# Patient Record
Sex: FEMALE | Race: WHITE | Employment: OTHER | URBAN - METROPOLITAN AREA
[De-identification: names, ages, dates, MRNs, and addresses within clinical notes are randomized per-mention and may not be internally consistent; named-entity substitution may affect disease eponyms.]

---

## 2017-08-01 ENCOUNTER — TELEPHONE (OUTPATIENT)
Dept: FAMILY MEDICINE CLINIC | Age: 82
End: 2017-08-01

## 2017-08-28 ENCOUNTER — TELEPHONE (OUTPATIENT)
Dept: CARDIOLOGY CLINIC | Age: 82
End: 2017-08-28

## 2018-07-04 PROBLEM — S72.141A CLOSED 2-PART INTERTROCHANTERIC FRACTURE OF PROXIMAL END OF RIGHT FEMUR (HCC): Status: ACTIVE | Noted: 2018-07-04

## 2018-07-15 PROBLEM — R55 SYNCOPE AND COLLAPSE: Status: ACTIVE | Noted: 2018-07-15

## 2018-07-15 PROBLEM — R77.8 ELEVATED TROPONIN: Status: ACTIVE | Noted: 2018-07-15

## 2018-07-15 PROBLEM — S72.141D CLOSED DISPLACED INTERTROCHANTERIC FRACTURE OF RIGHT FEMUR WITH ROUTINE HEALING: Status: ACTIVE | Noted: 2018-07-04

## 2018-07-15 PROBLEM — N39.0 UTI (URINARY TRACT INFECTION): Status: ACTIVE | Noted: 2018-07-15

## 2018-07-20 ENCOUNTER — OFFICE VISIT (OUTPATIENT)
Dept: ORTHOPEDIC SURGERY | Age: 83
End: 2018-07-20

## 2018-07-20 VITALS — HEIGHT: 65 IN | BODY MASS INDEX: 31.32 KG/M2 | RESPIRATION RATE: 16 BRPM | WEIGHT: 188 LBS

## 2018-07-20 DIAGNOSIS — S72.141D CLOSED DISPLACED INTERTROCHANTERIC FRACTURE OF RIGHT FEMUR WITH ROUTINE HEALING: Primary | ICD-10-CM

## 2018-07-20 PROCEDURE — 99024 POSTOP FOLLOW-UP VISIT: CPT | Performed by: ORTHOPAEDIC SURGERY

## 2018-07-20 NOTE — PROGRESS NOTES
ORTHOPAEDIC PROGRESS NOTE    Chief Complaint   Patient presents with    Post-Op Check     Right hip cephalomedullary nail; DOS 7/6/18. HPI  7/20/2018  First postop  At [facility] doing well  Denies N/T  Denies wound issues  Denies lightheadedness, chest pain, SOB    Vitals:    07/20/18 0925   Resp: 16   Weight: 188 lb (85.3 kg)   Height: 5' 5\" (1.651 m)       Physical Exam  RLE - incisions well healed, c/d/i    negative log roll   Able to stand up    Peripheral edema bilaterally  SILT SP/DP/T/sural/saphenous nerve distributions; EHL/TA/GS intact      Imaging:  Images were personally reviewed by myself and discussed with the patient  Right femur 4 views performed today in clinic - s/p CMN to IT fx. Alignment stable. No hardware issues. Assessment & Plan:  80 y.o. female following up for   Diagnosis Orders   1. Recent intertrochanteric fracture of right femur s/p Right hip cephalomedullary nail  XR FEMUR RIGHT (MIN 2 VIEWS)       No orders of the defined types were placed in this encounter.     RLE WBAT  Assistance at all times when OOB  RLE WBAT  Baseline baby ASA and plavix  Recommend bilateral compression stockings    She is on vit D3  Will need DEXA scan at some point    FU 6 months for hopefully final check    Eligah Epley

## 2018-07-25 ENCOUNTER — PROCEDURE VISIT (OUTPATIENT)
Dept: CARDIOLOGY CLINIC | Age: 83
End: 2018-07-25

## 2018-07-25 DIAGNOSIS — R55 SYNCOPE AND COLLAPSE: ICD-10-CM

## 2018-07-25 DIAGNOSIS — Z45.09 ENCOUNTER FOR ELECTRONIC ANALYSIS OF REVEAL EVENT RECORDER: Primary | ICD-10-CM

## 2018-07-25 PROCEDURE — 93291 INTERROG DEV EVAL SCRMS IP: CPT | Performed by: INTERNAL MEDICINE

## 2018-08-14 PROBLEM — R77.8 ELEVATED TROPONIN: Status: RESOLVED | Noted: 2018-07-15 | Resolved: 2018-08-14

## 2018-08-14 PROBLEM — N39.0 UTI (URINARY TRACT INFECTION): Status: RESOLVED | Noted: 2018-07-15 | Resolved: 2018-08-14

## 2018-08-28 ENCOUNTER — NURSE ONLY (OUTPATIENT)
Dept: CARDIOLOGY CLINIC | Age: 83
End: 2018-08-28

## 2018-08-28 DIAGNOSIS — Z45.09 ENCOUNTER FOR ELECTRONIC ANALYSIS OF REVEAL EVENT RECORDER: ICD-10-CM

## 2018-08-28 DIAGNOSIS — I48.0 PAROXYSMAL ATRIAL FIBRILLATION (HCC): ICD-10-CM

## 2018-08-28 PROCEDURE — 93298 REM INTERROG DEV EVAL SCRMS: CPT | Performed by: INTERNAL MEDICINE

## 2018-08-28 PROCEDURE — 93299 PR REM INTERROG ICPMS/SCRMS <30 D TECH REVIEW: CPT | Performed by: INTERNAL MEDICINE

## 2018-08-30 NOTE — PROGRESS NOTES
Carelink/loop recorder transmission. No new episodes/arrhythmias recorded since last interrogation. Dr. Robert Florentino to review interrogation. See interrogation/Paceart report for further details.

## 2018-09-05 ENCOUNTER — TELEPHONE (OUTPATIENT)
Dept: CARDIOLOGY CLINIC | Age: 83
End: 2018-09-05

## 2018-09-05 NOTE — TELEPHONE ENCOUNTER
Kenny Organ          9/5/18 2:09 PM   Note      Son calling to find out when and if this patient needs to be seen again .

## 2018-09-18 ENCOUNTER — OFFICE VISIT (OUTPATIENT)
Dept: ORTHOPEDIC SURGERY | Age: 83
End: 2018-09-18

## 2018-09-18 VITALS — WEIGHT: 180 LBS | BODY MASS INDEX: 29.99 KG/M2 | HEIGHT: 65 IN

## 2018-09-18 DIAGNOSIS — S72.141D CLOSED DISPLACED INTERTROCHANTERIC FRACTURE OF RIGHT FEMUR WITH ROUTINE HEALING: Primary | ICD-10-CM

## 2018-09-18 PROCEDURE — 99024 POSTOP FOLLOW-UP VISIT: CPT | Performed by: ORTHOPAEDIC SURGERY

## 2018-10-04 ENCOUNTER — OFFICE VISIT (OUTPATIENT)
Dept: CARDIOLOGY CLINIC | Age: 83
End: 2018-10-04
Payer: COMMERCIAL

## 2018-10-04 VITALS
SYSTOLIC BLOOD PRESSURE: 151 MMHG | HEIGHT: 65 IN | WEIGHT: 180 LBS | HEART RATE: 57 BPM | DIASTOLIC BLOOD PRESSURE: 75 MMHG | BODY MASS INDEX: 29.99 KG/M2

## 2018-10-04 DIAGNOSIS — I48.19 PERSISTENT ATRIAL FIBRILLATION (HCC): ICD-10-CM

## 2018-10-04 DIAGNOSIS — I48.0 PAROXYSMAL ATRIAL FIBRILLATION (HCC): ICD-10-CM

## 2018-10-04 DIAGNOSIS — I10 ESSENTIAL HYPERTENSION: ICD-10-CM

## 2018-10-04 DIAGNOSIS — R55 SYNCOPE AND COLLAPSE: Primary | ICD-10-CM

## 2018-10-04 DIAGNOSIS — R00.1 BRADYCARDIA: ICD-10-CM

## 2018-10-04 DIAGNOSIS — Z45.09 ENCOUNTER FOR ELECTRONIC ANALYSIS OF REVEAL EVENT RECORDER: ICD-10-CM

## 2018-10-04 PROCEDURE — 93291 INTERROG DEV EVAL SCRMS IP: CPT | Performed by: INTERNAL MEDICINE

## 2018-10-04 PROCEDURE — 99215 OFFICE O/P EST HI 40 MIN: CPT | Performed by: INTERNAL MEDICINE

## 2018-10-04 RX ORDER — AMIODARONE HYDROCHLORIDE 200 MG/1
100 TABLET ORAL DAILY
Qty: 30 TABLET | Refills: 5 | Status: SHIPPED | OUTPATIENT
Start: 2018-10-04

## 2018-10-04 NOTE — PROGRESS NOTES
significant gary down to 20 and 3 sec pause were seen. Therefore, I believe she needs a Permanent pacemaker. Will decrease amiodarone to 100 mg but I am afraid we can not stop it due to risk of Atrial fibrillation and that she can not be on anticoagulation. 7/17/2018 ILR placed for assessment   Pauses and significant bradycardia noted on today's interrogation.    -  I have discussed the procedure of a pacemaker implant related to her bradcardia. Risks, benefits and alternatives in detail with patient and family. The risks including, but not limited to, the risks of bleeding, infection, pain, device malfunction, lead dislodgement, radiation exposure, injury to cardiac and surrounding structures (including pneumothorax), stroke, cardiac perforation, tamponade, need for emergent heart surgery, myocardial infarction and death were discussed in detail. The patient declines pacemaker. She feels at her age she is content with the way things are.    -daughter states she and her brother will discuss PPM with patient to decide if she would like to proceed. - Cardiac device in situ - ILR 7/17/2018       Interrogation today NSR, 1 pause 8/30/18 3 seconds. 20 gary events 8/30 - 9/12/2018, longest at 2 minutes, rate <30bpm.  No AT/AF. -PAF              On amiodarone; reduce to 100mg today              In sinus rhythm; no AF noted on interrogation of ILR today     - HTN              BP is acceptable. Continue current meds. Plan: Will send paperwork to [facility] for scheduling of PPM, so it is available should she decide to proceed  Decrease amiodarone to 100mg  Follow up 6 months with EP      I independently reviewed device check interrogation     Thank you for allowing me to participate in the care of Justin Valencia. Further evaluation will be based upon the patient's clinical course and testing results. All questions and concerns were addressed to the patient/family.

## 2019-03-07 ENCOUNTER — HOSPITAL ENCOUNTER (EMERGENCY)
Age: 84
Discharge: HOME | End: 2019-03-07
Payer: COMMERCIAL

## 2019-03-07 VITALS
TEMPERATURE: 98.7 F | BODY MASS INDEX: 29.99 KG/M2 | SYSTOLIC BLOOD PRESSURE: 166 MMHG | HEIGHT: 65 IN | DIASTOLIC BLOOD PRESSURE: 57 MMHG | OXYGEN SATURATION: 100 % | HEART RATE: 62 BPM | RESPIRATION RATE: 16 BRPM | WEIGHT: 180 LBS

## 2019-03-07 DIAGNOSIS — Z87.898 H/O EPISTAXIS: Primary | ICD-10-CM

## 2019-03-07 PROCEDURE — 99283 EMERGENCY DEPT VISIT LOW MDM: CPT

## 2019-03-07 ASSESSMENT — ENCOUNTER SYMPTOMS
VOMITING: 0
RHINORRHEA: 0
SHORTNESS OF BREATH: 0
ABDOMINAL PAIN: 0
BLOOD IN STOOL: 0
NAUSEA: 0
DIARRHEA: 0
SORE THROAT: 0
CONSTIPATION: 0

## 2019-07-24 ENCOUNTER — TELEPHONE (OUTPATIENT)
Dept: FAMILY MEDICINE CLINIC | Age: 84
End: 2019-07-24

## 2019-10-03 ENCOUNTER — TELEPHONE (OUTPATIENT)
Dept: CARDIOLOGY CLINIC | Age: 84
End: 2019-10-03

## 2019-10-04 ENCOUNTER — TELEPHONE (OUTPATIENT)
Dept: CARDIOLOGY CLINIC | Age: 84
End: 2019-10-04

## 2019-12-09 ENCOUNTER — PROCEDURE VISIT (OUTPATIENT)
Dept: AUDIOLOGY | Age: 84
End: 2019-12-09
Payer: COMMERCIAL

## 2019-12-09 ENCOUNTER — OFFICE VISIT (OUTPATIENT)
Dept: ENT CLINIC | Age: 84
End: 2019-12-09
Payer: COMMERCIAL

## 2019-12-09 VITALS — SYSTOLIC BLOOD PRESSURE: 142 MMHG | OXYGEN SATURATION: 98 % | DIASTOLIC BLOOD PRESSURE: 84 MMHG | HEART RATE: 71 BPM

## 2019-12-09 DIAGNOSIS — H61.23 BILATERAL IMPACTED CERUMEN: Primary | ICD-10-CM

## 2019-12-09 DIAGNOSIS — H90.3 SENSORINEURAL HEARING LOSS (SNHL) OF BOTH EARS: ICD-10-CM

## 2019-12-09 DIAGNOSIS — H91.93 SEVERE HEARING LOSS OF BOTH EARS: ICD-10-CM

## 2019-12-09 PROCEDURE — 99202 OFFICE O/P NEW SF 15 MIN: CPT | Performed by: OTOLARYNGOLOGY

## 2019-12-09 PROCEDURE — 69210 REMOVE IMPACTED EAR WAX UNI: CPT | Performed by: OTOLARYNGOLOGY

## 2019-12-09 PROCEDURE — 92557 COMPREHENSIVE HEARING TEST: CPT | Performed by: AUDIOLOGIST

## 2019-12-09 ASSESSMENT — ENCOUNTER SYMPTOMS
FACIAL SWELLING: 0
ALLERGIC/IMMUNOLOGIC NEGATIVE: 1
RESPIRATORY NEGATIVE: 1
VOICE CHANGE: 0
SINUS PRESSURE: 0
TROUBLE SWALLOWING: 0
RHINORRHEA: 0
SORE THROAT: 0
SINUS PAIN: 0

## 2020-02-17 ENCOUNTER — NURSE ONLY (OUTPATIENT)
Dept: CARDIOLOGY CLINIC | Age: 85
End: 2020-02-17
Payer: COMMERCIAL

## 2020-02-17 PROCEDURE — G2066 INTER DEVC REMOTE 30D: HCPCS | Performed by: INTERNAL MEDICINE

## 2020-02-17 PROCEDURE — 93298 REM INTERROG DEV EVAL SCRMS: CPT | Performed by: INTERNAL MEDICINE

## 2020-02-17 NOTE — PROGRESS NOTES
[employer] remote Linq report shows no recent arrhythmia recordings. We will continue to monitor remotely.

## 2020-03-25 PROBLEM — I10 HTN (HYPERTENSION): Status: RESOLVED | Noted: 2020-03-25 | Resolved: 2020-03-24

## 2020-03-25 PROBLEM — E03.9 HYPOTHYROIDISM: Status: RESOLVED | Noted: 2020-03-25 | Resolved: 2020-03-24

## 2020-04-26 ENCOUNTER — APPOINTMENT (OUTPATIENT)
Dept: CT IMAGING | Age: 85
End: 2020-04-26
Payer: COMMERCIAL

## 2020-04-26 ENCOUNTER — APPOINTMENT (OUTPATIENT)
Dept: GENERAL RADIOLOGY | Age: 85
End: 2020-04-26
Payer: COMMERCIAL

## 2020-04-26 ENCOUNTER — HOSPITAL ENCOUNTER (OUTPATIENT)
Age: 85
Setting detail: OBSERVATION
Discharge: NURSING, REHAB OR LONG-TERM CARE | End: 2020-04-30
Attending: EMERGENCY MEDICINE | Admitting: INTERNAL MEDICINE
Payer: COMMERCIAL

## 2020-04-26 LAB
A/G RATIO: 1.4 (ref 1.1–2.2)
ALBUMIN SERPL-MCNC: 4 G/DL (ref 3.4–5)
ALP BLD-CCNC: 90 U/L (ref 40–129)
ALT SERPL-CCNC: <5 U/L (ref 10–40)
ANION GAP SERPL CALCULATED.3IONS-SCNC: 12 MMOL/L (ref 3–16)
APTT: 29.3 SEC (ref 24.2–36.2)
AST SERPL-CCNC: 12 U/L (ref 15–37)
BASOPHILS ABSOLUTE: 0 K/UL (ref 0–0.2)
BASOPHILS RELATIVE PERCENT: 0.6 %
BILIRUB SERPL-MCNC: 0.5 MG/DL (ref 0–1)
BUN BLDV-MCNC: 11 MG/DL (ref 7–20)
CALCIUM SERPL-MCNC: 10 MG/DL (ref 8.3–10.6)
CHLORIDE BLD-SCNC: 101 MMOL/L (ref 99–110)
CO2: 20 MMOL/L (ref 21–32)
CREAT SERPL-MCNC: 1 MG/DL (ref 0.6–1.2)
EOSINOPHILS ABSOLUTE: 0.1 K/UL (ref 0–0.6)
EOSINOPHILS RELATIVE PERCENT: 0.9 %
GFR AFRICAN AMERICAN: >60
GFR NON-AFRICAN AMERICAN: 52
GLOBULIN: 2.8 G/DL
GLUCOSE BLD-MCNC: 105 MG/DL (ref 70–99)
HCT VFR BLD CALC: 47 % (ref 36–48)
HEMOGLOBIN: 15.6 G/DL (ref 12–16)
INR BLD: 1.01 (ref 0.86–1.14)
LYMPHOCYTES ABSOLUTE: 0.9 K/UL (ref 1–5.1)
LYMPHOCYTES RELATIVE PERCENT: 11.7 %
MCH RBC QN AUTO: 30.6 PG (ref 26–34)
MCHC RBC AUTO-ENTMCNC: 33.1 G/DL (ref 31–36)
MCV RBC AUTO: 92.5 FL (ref 80–100)
MONOCYTES ABSOLUTE: 0.4 K/UL (ref 0–1.3)
MONOCYTES RELATIVE PERCENT: 5.9 %
NEUTROPHILS ABSOLUTE: 5.9 K/UL (ref 1.7–7.7)
NEUTROPHILS RELATIVE PERCENT: 80.9 %
PDW BLD-RTO: 13.7 % (ref 12.4–15.4)
PLATELET # BLD: 137 K/UL (ref 135–450)
PMV BLD AUTO: 10.1 FL (ref 5–10.5)
POTASSIUM REFLEX MAGNESIUM: 4 MMOL/L (ref 3.5–5.1)
PRO-BNP: 931 PG/ML (ref 0–449)
PROTHROMBIN TIME: 11.7 SEC (ref 10–13.2)
RBC # BLD: 5.09 M/UL (ref 4–5.2)
SODIUM BLD-SCNC: 133 MMOL/L (ref 136–145)
TOTAL PROTEIN: 6.8 G/DL (ref 6.4–8.2)
TROPONIN: <0.01 NG/ML
WBC # BLD: 7.3 K/UL (ref 4–11)

## 2020-04-26 PROCEDURE — 80053 COMPREHEN METABOLIC PANEL: CPT

## 2020-04-26 PROCEDURE — 99285 EMERGENCY DEPT VISIT HI MDM: CPT

## 2020-04-26 PROCEDURE — 84484 ASSAY OF TROPONIN QUANT: CPT

## 2020-04-26 PROCEDURE — 36415 COLL VENOUS BLD VENIPUNCTURE: CPT

## 2020-04-26 PROCEDURE — 86901 BLOOD TYPING SEROLOGIC RH(D): CPT

## 2020-04-26 PROCEDURE — 86850 RBC ANTIBODY SCREEN: CPT

## 2020-04-26 PROCEDURE — 85025 COMPLETE CBC W/AUTO DIFF WBC: CPT

## 2020-04-26 PROCEDURE — 70450 CT HEAD/BRAIN W/O DYE: CPT

## 2020-04-26 PROCEDURE — 71045 X-RAY EXAM CHEST 1 VIEW: CPT

## 2020-04-26 PROCEDURE — 93005 ELECTROCARDIOGRAM TRACING: CPT | Performed by: PHYSICIAN ASSISTANT

## 2020-04-26 PROCEDURE — 85610 PROTHROMBIN TIME: CPT

## 2020-04-26 PROCEDURE — 6370000000 HC RX 637 (ALT 250 FOR IP): Performed by: PHYSICIAN ASSISTANT

## 2020-04-26 PROCEDURE — 72128 CT CHEST SPINE W/O DYE: CPT

## 2020-04-26 PROCEDURE — 83880 ASSAY OF NATRIURETIC PEPTIDE: CPT

## 2020-04-26 PROCEDURE — 72125 CT NECK SPINE W/O DYE: CPT

## 2020-04-26 PROCEDURE — 72131 CT LUMBAR SPINE W/O DYE: CPT

## 2020-04-26 PROCEDURE — 86900 BLOOD TYPING SEROLOGIC ABO: CPT

## 2020-04-26 PROCEDURE — 85730 THROMBOPLASTIN TIME PARTIAL: CPT

## 2020-04-26 RX ORDER — ONDANSETRON 4 MG/1
8 TABLET, ORALLY DISINTEGRATING ORAL ONCE
Status: COMPLETED | OUTPATIENT
Start: 2020-04-26 | End: 2020-04-26

## 2020-04-26 RX ORDER — HYDROCODONE BITARTRATE AND ACETAMINOPHEN 5; 325 MG/1; MG/1
1 TABLET ORAL ONCE
Status: COMPLETED | OUTPATIENT
Start: 2020-04-26 | End: 2020-04-26

## 2020-04-26 RX ADMIN — HYDROCODONE BITARTRATE AND ACETAMINOPHEN 1 TABLET: 5; 325 TABLET ORAL at 22:28

## 2020-04-26 RX ADMIN — ONDANSETRON 8 MG: 4 TABLET, ORALLY DISINTEGRATING ORAL at 22:29

## 2020-04-26 ASSESSMENT — PAIN SCALES - GENERAL
PAINLEVEL_OUTOF10: 10
PAINLEVEL_OUTOF10: 10

## 2020-04-26 ASSESSMENT — PAIN DESCRIPTION - PAIN TYPE: TYPE: ACUTE PAIN

## 2020-04-26 ASSESSMENT — PAIN DESCRIPTION - LOCATION: LOCATION: BACK;HEAD

## 2020-04-27 ENCOUNTER — APPOINTMENT (OUTPATIENT)
Dept: GENERAL RADIOLOGY | Age: 85
End: 2020-04-27
Payer: COMMERCIAL

## 2020-04-27 ENCOUNTER — APPOINTMENT (OUTPATIENT)
Dept: CT IMAGING | Age: 85
End: 2020-04-27
Payer: COMMERCIAL

## 2020-04-27 ENCOUNTER — NURSE ONLY (OUTPATIENT)
Dept: CARDIOLOGY CLINIC | Age: 85
End: 2020-04-27
Payer: COMMERCIAL

## 2020-04-27 PROBLEM — S06.5XAA SUBDURAL HEMATOMA: Status: ACTIVE | Noted: 2020-04-27

## 2020-04-27 PROBLEM — R27.0 ATAXIA: Status: ACTIVE | Noted: 2020-04-27

## 2020-04-27 PROBLEM — S72.141D CLOSED DISPLACED INTERTROCHANTERIC FRACTURE OF RIGHT FEMUR WITH ROUTINE HEALING: Status: RESOLVED | Noted: 2018-07-04 | Resolved: 2020-04-27

## 2020-04-27 LAB
ABO/RH: NORMAL
ANTIBODY SCREEN: NORMAL
BACTERIA: ABNORMAL /HPF
BILIRUBIN URINE: NEGATIVE
BLOOD, URINE: NEGATIVE
CLARITY: ABNORMAL
COLOR: YELLOW
EKG ATRIAL RATE: 63 BPM
EKG DIAGNOSIS: NORMAL
EKG P AXIS: 34 DEGREES
EKG P-R INTERVAL: 130 MS
EKG Q-T INTERVAL: 416 MS
EKG QRS DURATION: 102 MS
EKG QTC CALCULATION (BAZETT): 425 MS
EKG R AXIS: -33 DEGREES
EKG T AXIS: 77 DEGREES
EKG VENTRICULAR RATE: 63 BPM
EPITHELIAL CELLS, UA: 2 /HPF (ref 0–5)
GLUCOSE URINE: NEGATIVE MG/DL
HYALINE CASTS: 2 /LPF (ref 0–8)
KETONES, URINE: NEGATIVE MG/DL
LEUKOCYTE ESTERASE, URINE: ABNORMAL
MICROSCOPIC EXAMINATION: YES
NITRITE, URINE: NEGATIVE
PH UA: 7.5 (ref 5–8)
PROTEIN UA: NEGATIVE MG/DL
RBC UA: 3 /HPF (ref 0–4)
SPECIFIC GRAVITY UA: 1.01 (ref 1–1.03)
URINE REFLEX TO CULTURE: YES
URINE TYPE: ABNORMAL
UROBILINOGEN, URINE: 0.2 E.U./DL
WBC UA: 7 /HPF (ref 0–5)

## 2020-04-27 PROCEDURE — G0378 HOSPITAL OBSERVATION PER HR: HCPCS

## 2020-04-27 PROCEDURE — G2066 INTER DEVC REMOTE 30D: HCPCS | Performed by: INTERNAL MEDICINE

## 2020-04-27 PROCEDURE — 93010 ELECTROCARDIOGRAM REPORT: CPT | Performed by: INTERNAL MEDICINE

## 2020-04-27 PROCEDURE — 93298 REM INTERROG DEV EVAL SCRMS: CPT | Performed by: INTERNAL MEDICINE

## 2020-04-27 PROCEDURE — 6370000000 HC RX 637 (ALT 250 FOR IP): Performed by: INTERNAL MEDICINE

## 2020-04-27 PROCEDURE — 70450 CT HEAD/BRAIN W/O DYE: CPT

## 2020-04-27 PROCEDURE — 73090 X-RAY EXAM OF FOREARM: CPT

## 2020-04-27 PROCEDURE — 87086 URINE CULTURE/COLONY COUNT: CPT

## 2020-04-27 PROCEDURE — 81001 URINALYSIS AUTO W/SCOPE: CPT

## 2020-04-27 RX ORDER — CLOPIDOGREL BISULFATE 75 MG/1
75 TABLET ORAL DAILY
Status: DISCONTINUED | OUTPATIENT
Start: 2020-04-27 | End: 2020-04-27

## 2020-04-27 RX ORDER — VITAMIN B COMPLEX
5000 TABLET ORAL EVERY OTHER DAY
Status: DISCONTINUED | OUTPATIENT
Start: 2020-04-27 | End: 2020-04-30 | Stop reason: HOSPADM

## 2020-04-27 RX ORDER — AMIODARONE HYDROCHLORIDE 200 MG/1
100 TABLET ORAL DAILY
Status: DISCONTINUED | OUTPATIENT
Start: 2020-04-27 | End: 2020-04-30 | Stop reason: HOSPADM

## 2020-04-27 RX ORDER — HYDRALAZINE HYDROCHLORIDE 10 MG/1
10 TABLET, FILM COATED ORAL EVERY 4 HOURS PRN
Status: DISCONTINUED | OUTPATIENT
Start: 2020-04-27 | End: 2020-04-30 | Stop reason: HOSPADM

## 2020-04-27 RX ORDER — ONDANSETRON 2 MG/ML
4 INJECTION INTRAMUSCULAR; INTRAVENOUS EVERY 6 HOURS PRN
Status: DISCONTINUED | OUTPATIENT
Start: 2020-04-27 | End: 2020-04-30 | Stop reason: HOSPADM

## 2020-04-27 RX ORDER — SENNA PLUS 8.6 MG/1
1 TABLET ORAL DAILY PRN
Status: DISCONTINUED | OUTPATIENT
Start: 2020-04-27 | End: 2020-04-30 | Stop reason: HOSPADM

## 2020-04-27 RX ORDER — LATANOPROST 50 UG/ML
1 SOLUTION/ DROPS OPHTHALMIC NIGHTLY
Status: DISCONTINUED | OUTPATIENT
Start: 2020-04-27 | End: 2020-04-27 | Stop reason: ALTCHOICE

## 2020-04-27 RX ORDER — LEVOTHYROXINE SODIUM 0.07 MG/1
75 TABLET ORAL DAILY
Status: DISCONTINUED | OUTPATIENT
Start: 2020-04-27 | End: 2020-04-30 | Stop reason: HOSPADM

## 2020-04-27 RX ORDER — HYDRALAZINE HYDROCHLORIDE 25 MG/1
25 TABLET, FILM COATED ORAL EVERY 8 HOURS SCHEDULED
Status: DISCONTINUED | OUTPATIENT
Start: 2020-04-27 | End: 2020-04-27

## 2020-04-27 RX ORDER — TRAZODONE HYDROCHLORIDE 50 MG/1
50 TABLET ORAL NIGHTLY
Status: DISCONTINUED | OUTPATIENT
Start: 2020-04-27 | End: 2020-04-30 | Stop reason: HOSPADM

## 2020-04-27 RX ORDER — ACETAMINOPHEN 325 MG/1
650 TABLET ORAL EVERY 6 HOURS PRN
Status: DISCONTINUED | OUTPATIENT
Start: 2020-04-27 | End: 2020-04-30 | Stop reason: HOSPADM

## 2020-04-27 RX ORDER — METHAZOLAMIDE 50 MG/1
50 TABLET ORAL 2 TIMES DAILY
Status: DISCONTINUED | OUTPATIENT
Start: 2020-04-27 | End: 2020-04-30 | Stop reason: HOSPADM

## 2020-04-27 RX ORDER — ACETAMINOPHEN 500 MG
1000 TABLET ORAL 2 TIMES DAILY
COMMUNITY

## 2020-04-27 RX ADMIN — HYDRALAZINE HYDROCHLORIDE 10 MG: 10 TABLET, FILM COATED ORAL at 21:51

## 2020-04-27 RX ADMIN — LEVOTHYROXINE SODIUM 75 MCG: 75 TABLET ORAL at 05:57

## 2020-04-27 RX ADMIN — ACETAMINOPHEN 650 MG: 325 TABLET, FILM COATED ORAL at 05:57

## 2020-04-27 RX ADMIN — VITAMIN D, TAB 1000IU (100/BT) 5000 UNITS: 25 TAB at 09:49

## 2020-04-27 RX ADMIN — AMIODARONE HYDROCHLORIDE 100 MG: 200 TABLET ORAL at 09:49

## 2020-04-27 RX ADMIN — ACETAMINOPHEN 650 MG: 325 TABLET, FILM COATED ORAL at 18:25

## 2020-04-27 RX ADMIN — TRAZODONE HYDROCHLORIDE 50 MG: 50 TABLET ORAL at 21:51

## 2020-04-27 ASSESSMENT — PAIN DESCRIPTION - DESCRIPTORS: DESCRIPTORS: ACHING

## 2020-04-27 ASSESSMENT — PAIN SCALES - GENERAL
PAINLEVEL_OUTOF10: 10
PAINLEVEL_OUTOF10: 10
PAINLEVEL_OUTOF10: 0
PAINLEVEL_OUTOF10: 8
PAINLEVEL_OUTOF10: 0
PAINLEVEL_OUTOF10: 5
PAINLEVEL_OUTOF10: 9
PAINLEVEL_OUTOF10: 10
PAINLEVEL_OUTOF10: 0
PAINLEVEL_OUTOF10: 0

## 2020-04-27 ASSESSMENT — PAIN DESCRIPTION - ORIENTATION: ORIENTATION: LEFT

## 2020-04-27 ASSESSMENT — PAIN DESCRIPTION - PAIN TYPE: TYPE: ACUTE PAIN

## 2020-04-27 ASSESSMENT — PAIN DESCRIPTION - LOCATION: LOCATION: HEAD;ARM;NECK

## 2020-04-27 NOTE — LETTER
[address] [facility] [phone] [address] [facility],4Th Floor [phone]    Pacemaker/Defibrillator Clinic          04/27/20        [address] [place] [id number]        Dear Martita Found    This letter is to inform you that we received the transmission from your monitor at home that checks your implanted heart device. The next date your monitor will automatically transmit will be 5-28-20. If your report needs attention we will notify you. Your device and monitor are wireless and most transmit cellularly, but please periodically check your monitor is still plugged in to the electrical outlet. If you still use the telephone land line to send please ensure the connection to the phone penelope is secure. This will help to ensure successful automatic transmissions in the future. Also, the monitor needs to be close to you while sleeping at night. Please be aware that the remote device transmission sites are periodically monitored only during regular business hours during which simultaneous in-office device clinics are being run. If your transmission requires attention, we will contact you as soon as possible. Thank you.             Erinn 81

## 2020-04-27 NOTE — PROGRESS NOTES
Contacted MD this AM about when the CT needed to be done. Did not get response about it. Also contacted him about the elevated b/p. When he placed orders for b/p, it was [facility] and he did not place and PRN b/p medication. Just send another message about when the CT needs to be done. He stated the CT can be done now to ensure bleeding is controlled. Contact CT to let them know answer. Transport placed.

## 2020-04-27 NOTE — ED PROVIDER NOTES
normal. There is no distension. Palpations: Abdomen is soft. There is no mass. Tenderness: There is no abdominal tenderness. There is no right CVA tenderness, left CVA tenderness, guarding or rebound. Hernia: No hernia is present. Musculoskeletal: Normal range of motion. Comments: Tenderness to palpation of the midline cervical, thoracic and lumbar spine with associated paraspinal muscular tenderness bilaterally. No chest wall tenderness to palpation. No pelvis instability. No TTP to the upper and lower extremities throughout. Full range of motion and strength of the upper and lower extremities now. Distal neurovascular intact. Gait deferred. Lymphadenopathy:      Cervical: No cervical adenopathy. Skin:     General: Skin is warm and dry. Coloration: Skin is not pale. Findings: No erythema. Neurological:      Mental Status: She is alert. Cranial Nerves: Cranial nerves are intact. Sensory: No sensory deficit. Motor: Motor function is intact. Comments: Alert but not oriented at this time. Cranial nerves II through XII intact. EOMs intact. PERRLA. No facial droop. Speech clear. Moves all 4 extremities without difficulty. Gait deferred.    Psychiatric:         Behavior: Behavior normal.         DIAGNOSTIC RESULTS   LABS:    Labs Reviewed   CBC WITH AUTO DIFFERENTIAL - Abnormal; Notable for the following components:       Result Value    Lymphocytes Absolute 0.9 (*)     All other components within normal limits    Narrative:     Performed at:  [facility] [address], [place], [facility] [address]   Phone [phone]   COMPREHENSIVE METABOLIC PANEL W/ REFLEX TO MG FOR LOW K - Abnormal; Notable for the following components:    Sodium 133 (*)     CO2 20 (*)     Glucose 105 (*)     GFR Non- 52 (*)     ALT <5 (*)     AST 12 (*)     All other components within normal limits    Narrative:     Performed at:  [facility] [address], [facility], [address]   Phone [phone]   BRAIN NATRIURETIC PEPTIDE - Abnormal; Notable for the following components:    Pro- (*)     All other components within normal limits    Narrative:     Performed at:  [facility] [address], [facility], [address]   Phone [phone]   PROTIME-INR    Narrative:     Performed at:  [facility] [address], [facility], [address]   Phone [phone]   APTT    Narrative:     Performed at:  [facility] [address], [facility], [address]   Phone [phone]   TROPONIN    Narrative:     Performed at:  [facility] [address], [facility], [address]   Phone [phone]   URINE RT REFLEX TO CULTURE   TYPE AND SCREEN    Narrative:     Performed at:  [facility] [address], [facility], [address]   Phone [phone]       All other labs were within normal range or not returned as of this dictation. EKG: All EKG's are interpreted by the Emergency Department Physician in the absence of a cardiologist.  Please see their note for interpretation of EKG. RADIOLOGY:   Non-plain film images such as CT, Ultrasound and MRI are read by the radiologist. Plain radiographic images are visualized and preliminarily interpreted by the ED Provider with the below findings:        Interpretation per the Radiologist below, if available at the time of this note:    XR RADIUS ULNA LEFT (2 VIEWS)   Final Result   No fracture or malalignment. XR CHEST PORTABLE   Final Result   Negative portable chest.         CT Lumbar Spine WO Contrast   Final Result   1. No fracture. 2. 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    3. The peripherally calcified lesion in the upper pole of the right kidney is   nonspecific. CT Thoracic Spine WO Contrast   Final Result   No acute thoracic spine trauma. Lipomatous liver mass. Differential includes metastatic liposarcoma,   malignant ovarian teratoma metastasis, benign dermoid or angiomyolipoma. CT Cervical Spine WO Contrast   Final Result   No acute abnormality of the cervical spine. CT Head WO Contrast   Final Result   8 mm acute right parafalcine subdural hematoma. Critical results were called by Dr. Neida Sheppard MD to [facility] on   4/26/2020 at 22:29. No results found. PROCEDURES   Unless otherwise noted below, none     Procedures    CRITICAL CARE TIME   N/A    CONSULTS:  IP CONSULT TO NEUROSURGERY  IP CONSULT TO HOSPITALIST  IP CONSULT TO INTERNAL MEDICINE      EMERGENCY DEPARTMENT COURSE and DIFFERENTIAL DIAGNOSIS/MDM:   Vitals:    Vitals:    04/26/20 2119 04/26/20 2130 04/26/20 2230   BP: (!) 196/82 (!) 196/82 (!) 183/72   Pulse: 71  64   Resp: 18  14   Temp: 98.5 °F (36.9 °C)     TempSrc: Oral     SpO2: 99% 99% 99%   Weight: 180 lb (81.6 kg)     Height: 5' 5\" (1.651 m)         Patient was given the following medications:  Medications   HYDROcodone-acetaminophen (NORCO) 5-325 MG per tablet 1 tablet (1 tablet Oral Given 4/26/20 2228)   ondansetron (ZOFRAN-ODT) disintegrating tablet 8 mg (8 mg Oral Given 4/26/20 2229)       Patient is a 80-year-old who presents to the ED with complaint of a fall. Very poor historian but had a witnessed fall from wheelchair with associated closed head injury. No loss of consciousness. Patient afebrile stable vital signs. Neurologically intact. No red flag symptoms. CT of the head showed 8 mm right parafalcine subdural hematoma. CT of the cervical spine, thoracic spine and lumbar spine showed no fracture but did show a right ovarian dermoid with additional suspected dermoid in the hepatic dome.   Radiologist read that metastatic liposarcoma possibility at this time. Peripherally calcified lesion in the upper pole of the right kidney also nonspecific at this time. Patient given Norco and Zofran here in the ED for symptom control. Chest x-ray unremarkable. X-ray of the left forearm showed no acute abnormality. Given history and physical examination suffered from fall with what appears to be traumatic subdural hematoma. Patient documented as DNR in review of records and facility confirms DNR status at this time. Case discussed with neurosurgeon, Dr. Simon Aguilar, who stated given patient's age and DNR status did not feel transport required to [facility], [facility]. at this time. Fletcher could be admitted to our hospitalist service for repeat head CT in 6 hours and evaluation by their service in the morning. Case was initially discussed with hospitalist, Dr. Karson Sanches, but then we realized patient is from extended care facility and would go to Dr. Ana Salinas for admission. Case discussed with Dr. Ana Salinas who graciously agreed to accept patient for admission at this time. FINAL IMPRESSION      1. Fall from wheelchair, initial encounter    2.  Subdural hematoma [facility])          DISPOSITION/PLAN   DISPOSITION Admitted 04/27/2020 12:04:32 AM      PATIENT REFERREDTO:  Martin Pacheco MD  [address] [place] [address]  [phone]            DISCHARGE MEDICATIONS:  Current Discharge Medication List          DISCONTINUED MEDICATIONS:  Current Discharge Medication List                 (Please note that portions of this note were completed with a voice recognition program.  Efforts were made to edit the dictations but occasionally words are mis-transcribed.)    ADRIANA Aparicio Cha (electronically signed)          ADRIANA Bailon  04/27/20 0144

## 2020-04-27 NOTE — ED NOTES
Pt.'s son Saúl Francis [phone] would like to be updated when updates available.        Vasiliy Chin RN  04/27/20 0100

## 2020-04-27 NOTE — ED NOTES
Called and gave report to Leo Arnold, 3A. Waiting for more admitting orders to be put in.         Diony Person RN  04/27/20 8098

## 2020-04-27 NOTE — ED NOTES
Bed: 05  Expected date:   Expected time:   Means of arrival:   Comments:  EMS  SF     Rebel Mitchell RN  04/26/20 1692

## 2020-04-27 NOTE — PROGRESS NOTES
Pt sitting in bed. States that dizziness feels better and pain is not as bad. CT of head is ordered by MD. Denies any SOB or nausea. A&O. Call light within reach. Bed locked in lowest position.

## 2020-04-27 NOTE — CONSULTS
lb 12.8 oz (78.4 kg)   SpO2 98%   BMI 28.76 kg/m²   GENERAL: elderly female lying in bed. She looks uncomfortable, holding her neck  HEENT:  sclera clear, pupils equal and reactive, mucus membranes moist and neck supple  NEUROLOGIC:  Awake, alert, oriented to name, place and time. Cranial nerves II-XII are grossly intact. Motor is 5 out of 5 bilaterally.        LABORATORY DATA:   CBC with Differential:    Lab Results   Component Value Date    WBC 7.3 04/26/2020    RBC 5.09 04/26/2020    HGB 15.6 04/26/2020    HCT 47.0 04/26/2020     04/26/2020    MCV 92.5 04/26/2020    MCH 30.6 04/26/2020    MCHC 33.1 04/26/2020    RDW 13.7 04/26/2020    SEGSPCT 55.0 04/10/2013    BANDSPCT 1 07/29/2013    LYMPHOPCT 11.7 04/26/2020    MONOPCT 5.9 04/26/2020    EOSPCT 5.4 02/06/2012    BASOPCT 0.6 04/26/2020    MONOSABS 0.4 04/26/2020    LYMPHSABS 0.9 04/26/2020    EOSABS 0.1 04/26/2020    BASOSABS 0.0 04/26/2020    DIFFTYPE Auto-K 04/10/2013     CMP:    Lab Results   Component Value Date     04/26/2020    K 4.0 04/26/2020     04/26/2020    CO2 20 04/26/2020    BUN 11 04/26/2020    CREATININE 1.0 04/26/2020    GFRAA >60 04/26/2020    GFRAA 55 04/10/2013    AGRATIO 1.4 04/26/2020    LABGLOM 52 04/26/2020    GLUCOSE 105 04/26/2020    PROT 6.8 04/26/2020    PROT 7.0 01/09/2013    LABALBU 4.0 04/26/2020    CALCIUM 10.0 04/26/2020    BILITOT 0.5 04/26/2020    ALKPHOS 90 04/26/2020    AST 12 04/26/2020    ALT <5 04/26/2020     PT/INR:    Lab Results   Component Value Date    PROTIME 11.7 04/26/2020    INR 1.01 04/26/2020     PTT:    Lab Results   Component Value Date    APTT 29.3 04/26/2020   [APTT}     IMAGING STUDIES:   CT of the Brain:  Result:   FINDINGS:   BRAIN/VENTRICLES: There is an 8 mm thick acute right parafalcine subdural   hematoma no other hemorrhage is identified. Liv Bile is no herniation or   hydrocephalus. Liv Bile is diffuse brain parenchymal volume loss.  Scattered   white matter abnormalities are

## 2020-04-28 LAB — URINE CULTURE, ROUTINE: NORMAL

## 2020-04-28 PROCEDURE — 6370000000 HC RX 637 (ALT 250 FOR IP): Performed by: INTERNAL MEDICINE

## 2020-04-28 PROCEDURE — G0378 HOSPITAL OBSERVATION PER HR: HCPCS

## 2020-04-28 RX ORDER — SENNA AND DOCUSATE SODIUM 50; 8.6 MG/1; MG/1
2 TABLET, FILM COATED ORAL DAILY
Status: DISCONTINUED | OUTPATIENT
Start: 2020-04-28 | End: 2020-04-30 | Stop reason: HOSPADM

## 2020-04-28 RX ORDER — CLOPIDOGREL BISULFATE 75 MG/1
75 TABLET ORAL DAILY
Qty: 30 TABLET | Refills: 3 | Status: ON HOLD | OUTPATIENT
Start: 2020-05-12 | End: 2022-07-03 | Stop reason: HOSPADM

## 2020-04-28 RX ORDER — HYDRALAZINE HYDROCHLORIDE 25 MG/1
25 TABLET, FILM COATED ORAL EVERY 8 HOURS SCHEDULED
Qty: 90 TABLET | Refills: 3 | Status: ON HOLD | OUTPATIENT
Start: 2020-04-28 | End: 2022-06-20 | Stop reason: HOSPADM

## 2020-04-28 RX ORDER — ASPIRIN 81 MG/1
81 TABLET ORAL DAILY
Qty: 90 TABLET | Refills: 1 | Status: SHIPPED | OUTPATIENT
Start: 2020-05-12 | End: 2021-02-04 | Stop reason: ALTCHOICE

## 2020-04-28 RX ORDER — HYDRALAZINE HYDROCHLORIDE 25 MG/1
25 TABLET, FILM COATED ORAL EVERY 8 HOURS SCHEDULED
Status: DISCONTINUED | OUTPATIENT
Start: 2020-04-28 | End: 2020-04-30 | Stop reason: HOSPADM

## 2020-04-28 RX ADMIN — HYDRALAZINE HYDROCHLORIDE 25 MG: 25 TABLET, FILM COATED ORAL at 16:58

## 2020-04-28 RX ADMIN — HYDROCORTISONE: 25 CREAM TOPICAL at 07:44

## 2020-04-28 RX ADMIN — SENNOSIDES 8.6 MG: 8.6 TABLET, FILM COATED ORAL at 18:51

## 2020-04-28 RX ADMIN — HYDRALAZINE HYDROCHLORIDE 10 MG: 10 TABLET, FILM COATED ORAL at 07:46

## 2020-04-28 RX ADMIN — TRAZODONE HYDROCHLORIDE 50 MG: 50 TABLET ORAL at 20:20

## 2020-04-28 RX ADMIN — AMIODARONE HYDROCHLORIDE 100 MG: 200 TABLET ORAL at 07:44

## 2020-04-28 RX ADMIN — LEVOTHYROXINE SODIUM 75 MCG: 75 TABLET ORAL at 06:10

## 2020-04-28 RX ADMIN — MAGNESIUM HYDROXIDE 30 ML: 400 SUSPENSION ORAL at 18:51

## 2020-04-28 RX ADMIN — HYDRALAZINE HYDROCHLORIDE 25 MG: 25 TABLET, FILM COATED ORAL at 20:21

## 2020-04-28 ASSESSMENT — PAIN SCALES - GENERAL
PAINLEVEL_OUTOF10: 3
PAINLEVEL_OUTOF10: 0

## 2020-04-28 ASSESSMENT — PAIN DESCRIPTION - LOCATION: LOCATION: NECK

## 2020-04-28 NOTE — DISCHARGE INSTR - COC
Continuity of Care Form    Patient Name: Mukesh Huitron   :  10/29/1929  MRN:  [id number]    Admit date:  2020  Discharge date:  2020    Code Status Order: [facility]   Advance Directives:   [facility] Documentation     Date/Time Healthcare Directive Type of Healthcare Directive Copy in [address] Agent's Name Healthcare Agent's Phone Number    20 0149  Yes, patient has an advance directive for healthcare treatment  Health care treatment directive  No, copy requested from family  Adult Children  --  --          Admitting Physician:  Kaylene Solitario MD  PCP: Holly Gray MD    Discharging Nurse: [facility] Unit/Room#: [id number]  Discharging Unit Phone Number: [phone]    Emergency Contact:   Extended Emergency Contact Information  Primary Emergency Contact: Wilberto Armstrong  Address: [address] [place] [address], [address] [place] [address],[facility] [address] Phone: [phone]  Mobile Phone: [phone]  Relation: Child  Secondary Emergency Contact: Francois Armstrong   [address] Phone: [phone]  Relation: Child    Past Surgical History:  Past Surgical History:   Procedure Laterality Date    APPENDECTOMY      OTHER SURGICAL HISTORY  2018    RIGHT hip gamma nail     OVARY SURGERY      TONSILLECTOMY         Immunization History:   Immunization History   Administered Date(s) Administered    Influenza Vaccine, unspecified formulation 2014, 2014, 09/10/2015, 09/10/2015    Influenza Virus Vaccine 2013, 11/10/2013, 10/01/2017    PPD Test 2013    Pneumococcal Conjugate 7-valent (Prevnar7) 11/10/2013    Pneumococcal Polysaccharide (Qzkcqjpez89) 2012, 2012    Zoster Live (Zostavax) 2012, 2012       Active Problems:  Patient Active Problem List   Diagnosis Code    Troponin I above reference range R79.89    Depression F32.9    HTN (hypertension) I10    Hypothyroid E03.9    Atrial fibrillation (HCC) I48.91    Persistent atrial fibrillation I48.19    Paroxysmal atrial fibrillation (HCC) I48.0    Fall from slip, trip, or stumble, initial encounter W01. 0XXA    Syncope and collapse R55    Encounter for electronic analysis of reveal event recorder Z45.09    Depression with anxiety F41.8    Rash and nonspecific skin eruption R21    Hyperlipidemia E78.5    Subdural hematoma ([address] S06.5X9A    Ataxia R27.0       Isolation/Infection:   Isolation          No Isolation        Patient Infection Status     None to display          Nurse Assessment:  Last Vital Signs: BP (!) 152/73   Pulse 66   Temp 98.5 °F (36.9 °C) (Oral)   Resp 16   Ht 5' 5\" (1.651 m)   Wt 174 lb 8 oz (79.2 kg) Comment: hob flat,2 pillows and a sheet  SpO2 95%   BMI 29.04 kg/m²     Last documented pain score (0-10 scale): Pain Level: 3  Last Weight:   Wt Readings from Last 1 Encounters:   04/28/20 174 lb 8 oz (79.2 kg)     Mental Status:  oriented and alert    IV Access:  - None    Nursing Mobility/ADLs:  Walking   Assisted  Transfer  Assisted  Bathing  Assisted  Dressing  Assisted  Toileting  Assisted  Feeding  Independent  Med Admin  Independent  Med Delivery   whole    Wound Care Documentation and Therapy:  Incision 07/05/18 Hip Right (Active)   Number of days: 123        Elimination:  Continence:   · Bowel:  Yes  · Bladder: Yes  Urinary Catheter: None   Colostomy/Ileostomy/Ileal Conduit: No       Date of Last BM: 4/30    Intake/Output Summary (Last 24 hours) at 4/28/2020 1456  Last data filed at 4/28/2020 1103  Gross per 24 hour   Intake 240 ml   Output 1100 ml   Net -860 ml     I/O last 3 completed shifts:  In: -   Out: 500 [Urine:500]    Safety Concerns:     History of Falls (last 30 days)    Impairments/Disabilities:      Hearing    Nutrition Therapy:  Current Nutrition Therapy:   - Oral Diet:  General    Routes of Feeding: Oral  Liquids: No Restrictions  Daily Fluid Restriction: no  Last Modified Barium Swallow with Video (Video Swallowing Test): not done    Treatments at the Time of Hospital Discharge:   Respiratory Treatments: n/a  Oxygen Therapy:  is not on home oxygen therapy. Ventilator:    - No ventilator support    Rehab Therapies: Physical Therapy and Occupational Therapy  Weight Bearing Status/Restrictions: No weight bearing restirctions  Other Medical Equipment (for information only, NOT a DME order):  walker  Other Treatments:     Patient's personal belongings (please select all that are sent with patient):  Dentures upper    RN SIGNATURE:  Electronically signed by Anthony Ruano RN on 4/30/20 at 10:24 AM EDT    CASE MANAGEMENT/SOCIAL WORK SECTION    Inpatient Status Date: pt observation     Readmission Risk Assessment Score:  Readmission Risk              Risk of Unplanned Readmission:        0           Discharging to Facility/ Agency   · Name: [facility]   · [address] [facility] [address]  · Phone:[phone]        FOW:[phone]  Dialysis Facility (if applicable)   · Name:  · Address:  · Dialysis Schedule:  · Phone:  · Fax:    / signature: Bharathi Garcia RN, BSN  [phone]       PHYSICIAN SECTION    Prognosis: Guarded    Condition at Discharge: Stable    Rehab Potential (if transferring to Rehab): Guarded    Recommended Labs or Other Treatments After Discharge: PT eval restart aspirin and plavix in 2 weeks on may 12 th 3342    Physician Certification: I certify the above information and transfer of Mukesh Huitron  is necessary for the continuing treatment of the diagnosis listed and that she requires Intermediate Nursing Care for greater 30 days.      Update Admission H&P: No change in H&P    PHYSICIAN SIGNATURE:  Electronically signed by Kaylene Solitario MD on 4/28/20 at 2:56 PM EDT

## 2020-04-29 PROCEDURE — 97165 OT EVAL LOW COMPLEX 30 MIN: CPT

## 2020-04-29 PROCEDURE — 97116 GAIT TRAINING THERAPY: CPT

## 2020-04-29 PROCEDURE — G0378 HOSPITAL OBSERVATION PER HR: HCPCS

## 2020-04-29 PROCEDURE — 6370000000 HC RX 637 (ALT 250 FOR IP): Performed by: INTERNAL MEDICINE

## 2020-04-29 PROCEDURE — 97161 PT EVAL LOW COMPLEX 20 MIN: CPT

## 2020-04-29 RX ORDER — SODIUM PHOSPHATE, DIBASIC AND SODIUM PHOSPHATE, MONOBASIC 7; 19 G/133ML; G/133ML
1 ENEMA RECTAL
Status: COMPLETED | OUTPATIENT
Start: 2020-04-29 | End: 2020-04-29

## 2020-04-29 RX ADMIN — HYDRALAZINE HYDROCHLORIDE 25 MG: 25 TABLET, FILM COATED ORAL at 20:43

## 2020-04-29 RX ADMIN — LEVOTHYROXINE SODIUM 75 MCG: 75 TABLET ORAL at 06:30

## 2020-04-29 RX ADMIN — HYDRALAZINE HYDROCHLORIDE 25 MG: 25 TABLET, FILM COATED ORAL at 06:38

## 2020-04-29 RX ADMIN — SENNOSIDES AND DOCUSATE SODIUM 2 TABLET: 8.6; 5 TABLET ORAL at 08:11

## 2020-04-29 RX ADMIN — MAGNESIUM HYDROXIDE 30 ML: 400 SUSPENSION ORAL at 08:11

## 2020-04-29 RX ADMIN — HYDRALAZINE HYDROCHLORIDE 25 MG: 25 TABLET, FILM COATED ORAL at 15:34

## 2020-04-29 RX ADMIN — SODIUM PHOSPHATE 1 ENEMA: 7; 19 ENEMA RECTAL at 10:15

## 2020-04-29 RX ADMIN — VITAMIN D, TAB 1000IU (100/BT) 5000 UNITS: 25 TAB at 08:10

## 2020-04-29 RX ADMIN — AMIODARONE HYDROCHLORIDE 100 MG: 200 TABLET ORAL at 08:11

## 2020-04-29 RX ADMIN — TRAZODONE HYDROCHLORIDE 50 MG: 50 TABLET ORAL at 20:43

## 2020-04-29 ASSESSMENT — PAIN SCALES - GENERAL
PAINLEVEL_OUTOF10: 0

## 2020-04-29 NOTE — PROGRESS NOTES
Physical Therapy    Facility/Department: [facility] 3A NURSING  Initial Assessment    NAME: Amelia Barrera  : 10/29/1929  MRN: [id number]    Date of Service: 2020    Discharge Recommendations:Anahi Beltrán scored a 17/24 on the AM-PAC short mobility form. Current research shows that an AM-PAC score of 17 or less is typically not associated with a discharge to the patient's home setting. Based on the patients AM-PAC score and their current functional mobility deficits, it is recommended that the patient have 3-5 sessions per week of Physical Therapy at d/c to increase the patients independence. If patient discharges prior to next session this note will serve as a discharge summary. Please see below for the latest assessment towards goals. PT Equipment Recommendations  Equipment Needed: No    Assessment   Body structures, Functions, Activity limitations: Decreased functional mobility ; Decreased ADL status; Decreased strength;Decreased cognition;Decreased endurance;Decreased balance;Decreased posture  Assessment: Pt presents as below her baseline function. Pt would benefit from skilled PT services to promote safe return to PLOF. Prognosis: Good  Decision Making: Low Complexity  PT Education: Goals;PT Role;Plan of Care  Patient Education: D/C  recommendations--pt would benefit from reinforcement  REQUIRES PT FOLLOW UP: Yes  Activity Tolerance  Activity Tolerance: Patient Tolerated treatment well;Patient limited by endurance       Patient Diagnosis(es): The primary encounter diagnosis was Fall from wheelchair, initial encounter. A diagnosis of Subdural hematoma (HCC) was also pertinent to this visit.      has a past medical history of Atrial fibrillation ([address] Depression, Dermatitis, Elevated blood sugar, Fatigue, Glaucoma, Hyperglycemia, Hyperlipidemia, Hypertension, Hypothyroidism, Impaired fasting blood sugar, Impaired fasting glucose, Pulmonary hypertension, primary ([address] Thrombocytopenia ([address]

## 2020-04-29 NOTE — PROGRESS NOTES
Occupational Therapy   Occupational Therapy Initial Assessment  Date: 2020   Patient Name: Lauren Sow  MRN: [id number]     : 10/29/1929    Date of Service: 2020    Discharge Recommendations: Lauren Sow scored a 16/24 on the AM-PAC ADL Inpatient form. Current research shows that an AM-PAC score of 17 or less is typically not associated with a discharge to the patient's home setting. Based on the patients AM-PAC score and their current ADL deficits, it is recommended that the patient have 3-5 sessions per week of Occupational Therapy at d/c to increase the patients independence. If patient discharges prior to next session this note will serve as a discharge summary. Please see below for the latest assessment towards goals. OT Equipment Recommendations  Equipment Needed: No    Assessment   Performance deficits / Impairments: Decreased functional mobility ; Decreased high-level IADLs;Decreased ADL status; Decreased endurance;Decreased strength;Decreased balance  Assessment: Patient presents below baseline d/t above deficits; OT indicated to maximize safety and independence with ADL and IADL  Treatment Diagnosis: Above deficits associated with subdural hematoma  Prognosis: Good  Decision Making: Low Complexity  Exam: as above  OT Education: OT Role;Plan of Care  Patient Education: evaluation, discharge - patient verbalizes understanding, however would benefit from continued reinforcement  REQUIRES OT FOLLOW UP: Yes  Activity Tolerance  Activity Tolerance: Patient Tolerated treatment well;Treatment limited secondary to decreased cognition  Safety Devices  Safety Devices in place: Yes  Type of devices: All fall risk precautions in place; Left in chair;Call light within reach; Chair alarm in place;Gait belt;Nurse notified           Patient Diagnosis(es): The primary encounter diagnosis was Fall from wheelchair, initial encounter.  A diagnosis of Subdural hematoma (HCC) was also pertinent to this visit. has a past medical history of Atrial fibrillation ([facility] [address] Depression, Dermatitis, Elevated blood sugar, Fatigue, Glaucoma, Hyperglycemia, Hyperlipidemia, Hypertension, Hypothyroidism, Impaired fasting blood sugar, Impaired fasting glucose, Pulmonary hypertension, primary ([address] Thrombocytopenia ([facility] [address] Vitamin D deficiency, and Vitamin D deficiency. has a past surgical history that includes Appendectomy; Ovary surgery; Tonsillectomy; and other surgical history (07/05/2018). Treatment Diagnosis: Above deficits associated with subdural hematoma      Restrictions  Restrictions/Precautions  Restrictions/Precautions: Fall Risk(high fall risk)  Required Braces or Orthoses?: No  Position Activity Restriction  Other position/activity restrictions: 80 y.o. female with past medical atrial fibrillation, hyperlipidemia, hypertension, hypothyroidism, portal hypertension and thrombocytopenia who presents to the plan of a fall. Patient poor historian but complains of fall. Per squad patient fell out of wheelchair. Apparently hit her head. No documented loss of consciousness. Patient complaining of posterior headache, neck pain and back pain. Denies chest pain, shortness of breath, cough, abdominal pain, nausea/vomiting, urinary symptoms or changes in bowel movements. No documented abrasion or laceration. Patient states pain is sharp in nature rated 10/10. Denies any other injury throughout. Is documented as being on aspirin and Plavix. No other blood thinners documented. Patient very poor historian this time. Subjective   General  Chart Reviewed: Yes  Family / Caregiver Present: Yes  Diagnosis: Subdural hematoma  Subjective  Subjective: Patient supine in bed, agreeable to evaluation.  Reporting discomfort related to constipation, denies pain  Patient Currently in Pain: Denies  Vital Signs  Patient Currently in Pain: Denies     Social/Functional History  Social/Functional History  Type of Home: ([address] [facility])  Home Layout: One level  Home Access: Level entry  Bathroom Shower/Tub: Tub/Shower unit  Bathroom Toilet: Standard  Bathroom Equipment: Shower chair, Grab bars in [address] [place]: 4 wheeled walker, Cane  ADL Assistance: Needs assistance(mod A bathing, able to dress on own)  Homemaking Responsibilities: No  Ambulation Assistance: Independent(with 0YI)  Transfer Assistance: Needs assistance(assist for tub transfer)  Active : No  Patient's  Info: pt uses transport services for MD appts  Additional Comments: Pt questionable historian - reports no additional falls besides one leading to this admission. Per RN, pt has had falls at facility prior to this admission. Objective   Vision: Impaired  Vision Exceptions: (partially blind d/t glaucoma, L eye better than R eye)  Hearing: Exceptions to [facility]  Hearing Exceptions: Bilateral hearing aid;Hard of hearing/hearing concerns(does not like wearing hearing aides)    Orientation  Overall Orientation Status: Within Functional Limits  Observation/Palpation  Posture: Fair  Balance  Sitting Balance: Stand by assistance  Standing Balance: Contact guard assistance  Functional Mobility  Functional - Mobility Device: Rolling Walker  Activity: Other  Assist Level: Contact guard assistance  Functional Mobility Comments: ~100' in hallway, decreased loida and step length  ADL  Feeding: Setup  Grooming: Stand by assistance(oral hygiene in stance at sink)  Tone RUE  RUE Tone: Normotonic  Tone LUE  LUE Tone: Normotonic     Bed mobility  Supine to Sit: Stand by assistance  Scooting: Stand by assistance  Transfers  Sit to stand: Contact guard assistance(x2, from EOB, recliner)  Stand to sit: Contact guard assistance(x2, to recliner)     Cognition  Overall Cognitive Status: Exceptions  Arousal/Alertness: Appropriate responses to stimuli  Following Commands:  Follows one step commands with repetition  Attention Span: Attends with cues to redirect  Memory: Decreased short term memory;Decreased long term memory  Problem Solving: Assistance required to implement solutions  Initiation: Requires cues for some  Sequencing: Requires cues for some  Perception  Overall Perceptual Status: WFL     Sensation  Overall Sensation Status: WFL        LUE AROM (degrees)  LUE AROM : WFL  RUE AROM (degrees)  RUE AROM : WFL         Plan   Plan  Times per week: 3-5  Times per day: Daily  Current Treatment Recommendations: Strengthening, Safety Education & Training, Functional Mobility Training, Endurance Training, Equipment Evaluation, Education, & procurement, Cognitive Reorientation, Patient/Caregiver Education & Training, Self-Care / ADL, Balance Training    G-Code     OutComes Score                                                  AM-PAC Score        AM-[facility] Inpatient Daily Activity Raw Score: 16 (04/29/20 1039)  AM-PAC Inpatient ADL T-Scale Score : 35.96 (04/29/20 1039)  ADL Inpatient CMS 0-100% Score: 53.32 (04/29/20 1039)  ADL Inpatient CMS G-Code Modifier : CK (04/29/20 1039)    Goals  Short term goals  Time Frame for Short term goals: discharge  Short term goal 1: UB ADL SBA  Short term goal 2: LB ADL min A  Short term goal 3: Fxl transfers SBA  Short term goal 4: Fxl mob SBA  Short term goal 5: Toileting SBA  Long term goals  Time Frame for Long term goals : LTG=STG       Therapy Time   Individual Concurrent Group Co-treatment   Time In 0817         Time Out 0858         Minutes 41            Timed Code Treatment Minutes:    15 minutes    Total Treatment Minutes:  41 minutes    Billing split with PT secondary to patient's observation status.     SANDRINE Graham OTR/L [id number]    Malcolm Eisenberg OT

## 2020-04-29 NOTE — CARE COORDINATION
Discharge Planning Assessment  Discharge Planning Assessment  RN/SW discharge planner met with patient/ (and family member) to discuss reason for admission, current living situation, and potential needs at the time of discharge     Demographics/Insurance verified Yes Twandeshawn Maggie     Current type of dwelling: Assisted Living at [facility]     Patient from ECF/SW confirmed with: Ewa    Living arrangements:assisted living     Level of function/Support:minimal assistance sometimes with dressing, pt ambulates with walker independently    PCP: Dr Sarah Jauregui    Last Visit to [address] [place] [address] at this time     DME:walker, internal life alert     Active with any community resources/agencies/skilled home care: none     Medication compliance issues:provided by staff     Financial issues that could impact healthcare:none         Tentative discharge plan: therapy evaluations today indicate skilled, [place] is checking to see if a pre cert is required. Pt can return to skilled services at [facility]   Discussed and provided facilities of choice if transition to a skilled nursing facility is required at the time of discharge n/a       Discussed with patient and/or family that on the day of discharge home tentative time of discharge will be between 10 AM and noon.     Transportation at the time of discharge: transportation service to facility     Mary Singer, [facility], BSN  [phone]

## 2020-04-29 NOTE — PROGRESS NOTES
mg, 10 mg, Oral, Q4H PRN    Physical      Vitals: BP (!) 150/71   Pulse 80   Temp 97.5 °F (36.4 °C) (Oral)   Resp 16   Ht 5' 5\" (1.651 m)   Wt 170 lb 1.6 oz (77.2 kg) Comment: hob flat,2 pillows and a sheet  SpO2 95%   BMI 28.31 kg/m²   Temp: Temp: 97.5 °F (36.4 °C)  Max: Temp  Av.7 °F (36.5 °C)  Min: 96.8 °F (36 °C)  Max: 98.7 °F (37.1 °C)  Respiration range:  Resp  Av  Min: 16  Max: 16  Pulse Range:  Pulse  Av  Min: 58  Max: 80  Blood pressure range:  Systolic (38GYQ), SQD:586 , Min:120 , PUI:187   , Diastolic (85SNW), MJD:89, Min:64, Max:93    SpO2  Av.5 %  Min: 94 %  Max: 97 %    Intake/Output Summary (Last 24 hours) at 2020 1030  Last data filed at 2020 0801  Gross per 24 hour   Intake --   Output 1100 ml   Net -1100 ml       Vent settings:  Pulse  Av.7  Min: 58  Max: 80  Resp  Av.6  Min: 14  Max: 18  SpO2  Av.8 %  Min: 94 %  Max: 99 %    CONSTITUTIONAL:  fatigued, alert, cooperative, mild distress and appears stated age  EYES:  Unremarkable   NECK:  Mild JVD  and supple, symmetrical, trachea midline  BACK:  symmetric and no curvature  LUNGS:  No increased work of breathing, good air exchange, clear to auscultation bilaterally, no crackles or wheezing  CARDIOVASCULAR:  Normal apical impulse, regular rate and rhythm, normal S1 and S2, no S3 or S4, and no murmur noted  ABDOMEN:  Soft non tender BS +   GENITAL/URINARY:  NEX   MUSCULOSKELETAL:  No edema  NEUROLOGIC:  No focal sensory motor findings   SKIN:  Warm dry and pale  and no bruising or bleeding    Data      Recent Results (from the past 96 hour(s))   CBC Auto Differential    Collection Time: 20 10:48 PM   Result Value Ref Range    WBC 7.3 4.0 - 11.0 K/uL    RBC 5.09 4.00 - 5.20 M/uL    Hemoglobin 15.6 12.0 - 16.0 g/dL    Hematocrit 47.0 36.0 - 48.0 %    MCV 92.5 80.0 - 100.0 fL    MCH 30.6 26.0 - 34.0 pg    MCHC 33.1 31.0 - 36.0 g/dL    RDW 13.7 12.4 - 15.4 %    Platelets 961 431 - 841 K/uL    MPV 10.1 5.0 - 10.5 fL    Neutrophils % 80.9 %    Lymphocytes % 11.7 %    Monocytes % 5.9 %    Eosinophils % 0.9 %    Basophils % 0.6 %    Neutrophils Absolute 5.9 1.7 - 7.7 K/uL    Lymphocytes Absolute 0.9 (L) 1.0 - 5.1 K/uL    Monocytes Absolute 0.4 0.0 - 1.3 K/uL    Eosinophils Absolute 0.1 0.0 - 0.6 K/uL    Basophils Absolute 0.0 0.0 - 0.2 K/uL   Comprehensive Metabolic Panel w/ Reflex to MG    Collection Time: 04/26/20 10:48 PM   Result Value Ref Range    Sodium 133 (L) 136 - 145 mmol/L    Potassium reflex Magnesium 4.0 3.5 - 5.1 mmol/L    Chloride 101 99 - 110 mmol/L    CO2 20 (L) 21 - 32 mmol/L    Anion Gap 12 3 - 16    Glucose 105 (H) 70 - 99 mg/dL    BUN 11 7 - 20 mg/dL    CREATININE 1.0 0.6 - 1.2 mg/dL    GFR Non-African American 52 (A) >60    GFR African American >60 >60    Calcium 10.0 8.3 - 10.6 mg/dL    Total Protein 6.8 6.4 - 8.2 g/dL    Alb 4.0 3.4 - 5.0 g/dL    Albumin/Globulin Ratio 1.4 1.1 - 2.2    Total Bilirubin 0.5 0.0 - 1.0 mg/dL    Alkaline Phosphatase 90 40 - 129 U/L    ALT <5 (L) 10 - 40 U/L    AST 12 (L) 15 - 37 U/L    Globulin 2.8 g/dL   Protime-INR    Collection Time: 04/26/20 10:48 PM   Result Value Ref Range    Protime 11.7 10.0 - 13.2 sec    INR 1.01 0.86 - 1.14   APTT    Collection Time: 04/26/20 10:48 PM   Result Value Ref Range    aPTT 29.3 24.2 - 36.2 sec   Troponin    Collection Time: 04/26/20 10:48 PM   Result Value Ref Range    Troponin <0.01 <0.01 ng/mL   Brain Natriuretic Peptide    Collection Time: 04/26/20 10:48 PM   Result Value Ref Range    Pro- (H) 0 - 449 pg/mL   TYPE AND SCREEN    Collection Time: 04/26/20 10:48 PM   Result Value Ref Range    ABO/Rh O NEG     Antibody Screen NEG    EKG 12 Lead    Collection Time: 04/26/20 11:02 PM   Result Value Ref Range    Ventricular Rate 63 BPM    Atrial Rate 63 BPM    P-R Interval 130 ms    QRS Duration 102 ms    Q-T Interval 416 ms    QTc Calculation (Bazett) 425 ms    P Axis 34 degrees    R Axis -33 degrees    T Axis 77

## 2020-04-30 VITALS
OXYGEN SATURATION: 97 % | WEIGHT: 165.8 LBS | TEMPERATURE: 98.9 F | RESPIRATION RATE: 16 BRPM | SYSTOLIC BLOOD PRESSURE: 102 MMHG | BODY MASS INDEX: 27.63 KG/M2 | HEART RATE: 81 BPM | HEIGHT: 65 IN | DIASTOLIC BLOOD PRESSURE: 72 MMHG

## 2020-04-30 PROCEDURE — 6370000000 HC RX 637 (ALT 250 FOR IP): Performed by: INTERNAL MEDICINE

## 2020-04-30 PROCEDURE — 97535 SELF CARE MNGMENT TRAINING: CPT

## 2020-04-30 PROCEDURE — 97530 THERAPEUTIC ACTIVITIES: CPT

## 2020-04-30 PROCEDURE — G0378 HOSPITAL OBSERVATION PER HR: HCPCS

## 2020-04-30 RX ORDER — SENNA AND DOCUSATE SODIUM 50; 8.6 MG/1; MG/1
2 TABLET, FILM COATED ORAL DAILY
COMMUNITY
Start: 2020-05-01 | End: 2021-02-04 | Stop reason: ALTCHOICE

## 2020-04-30 RX ADMIN — HYDRALAZINE HYDROCHLORIDE 25 MG: 25 TABLET, FILM COATED ORAL at 05:23

## 2020-04-30 RX ADMIN — MAGNESIUM HYDROXIDE 30 ML: 400 SUSPENSION ORAL at 08:58

## 2020-04-30 RX ADMIN — SENNOSIDES AND DOCUSATE SODIUM 2 TABLET: 8.6; 5 TABLET ORAL at 08:58

## 2020-04-30 RX ADMIN — LEVOTHYROXINE SODIUM 75 MCG: 75 TABLET ORAL at 05:23

## 2020-04-30 RX ADMIN — AMIODARONE HYDROCHLORIDE 100 MG: 200 TABLET ORAL at 08:58

## 2020-04-30 ASSESSMENT — PAIN SCALES - GENERAL: PAINLEVEL_OUTOF10: 0

## 2020-04-30 NOTE — PROGRESS NOTES
age  EYES:  Unremarkable   NECK:  Mild JVD  and supple, symmetrical, trachea midline  BACK:  symmetric and no curvature  LUNGS:  No increased work of breathing, good air exchange, clear to auscultation bilaterally, no crackles or wheezing  CARDIOVASCULAR:  Normal apical impulse, regular rate and rhythm, normal S1 and S2, no S3 or S4, and no murmur noted  ABDOMEN:  Soft non tender BS +   GENITAL/URINARY:  NEX   MUSCULOSKELETAL:  No edema  NEUROLOGIC:  No focal sensory motor findings   SKIN:  Warm dry and pale  and no bruising or bleeding    Data      Recent Results (from the past 96 hour(s))   CBC Auto Differential    Collection Time: 04/26/20 10:48 PM   Result Value Ref Range    WBC 7.3 4.0 - 11.0 K/uL    RBC 5.09 4.00 - 5.20 M/uL    Hemoglobin 15.6 12.0 - 16.0 g/dL    Hematocrit 47.0 36.0 - 48.0 %    MCV 92.5 80.0 - 100.0 fL    MCH 30.6 26.0 - 34.0 pg    MCHC 33.1 31.0 - 36.0 g/dL    RDW 13.7 12.4 - 15.4 %    Platelets 572 916 - 924 K/uL    MPV 10.1 5.0 - 10.5 fL    Neutrophils % 80.9 %    Lymphocytes % 11.7 %    Monocytes % 5.9 %    Eosinophils % 0.9 %    Basophils % 0.6 %    Neutrophils Absolute 5.9 1.7 - 7.7 K/uL    Lymphocytes Absolute 0.9 (L) 1.0 - 5.1 K/uL    Monocytes Absolute 0.4 0.0 - 1.3 K/uL    Eosinophils Absolute 0.1 0.0 - 0.6 K/uL    Basophils Absolute 0.0 0.0 - 0.2 K/uL   Comprehensive Metabolic Panel w/ Reflex to MG    Collection Time: 04/26/20 10:48 PM   Result Value Ref Range    Sodium 133 (L) 136 - 145 mmol/L    Potassium reflex Magnesium 4.0 3.5 - 5.1 mmol/L    Chloride 101 99 - 110 mmol/L    CO2 20 (L) 21 - 32 mmol/L    Anion Gap 12 3 - 16    Glucose 105 (H) 70 - 99 mg/dL    BUN 11 7 - 20 mg/dL    CREATININE 1.0 0.6 - 1.2 mg/dL    GFR Non-African American 52 (A) >60    GFR African American >60 >60    Calcium 10.0 8.3 - 10.6 mg/dL    Total Protein 6.8 6.4 - 8.2 g/dL    Alb 4.0 3.4 - 5.0 g/dL    Albumin/Globulin Ratio 1.4 1.1 - 2.2    Total Bilirubin 0.5 0.0 - 1.0 mg/dL    Alkaline Phosphatase 90 40 - 129 U/L    ALT <5 (L) 10 - 40 U/L    AST 12 (L) 15 - 37 U/L    Globulin 2.8 g/dL   Protime-INR    Collection Time: 04/26/20 10:48 PM   Result Value Ref Range    Protime 11.7 10.0 - 13.2 sec    INR 1.01 0.86 - 1.14   APTT    Collection Time: 04/26/20 10:48 PM   Result Value Ref Range    aPTT 29.3 24.2 - 36.2 sec   Troponin    Collection Time: 04/26/20 10:48 PM   Result Value Ref Range    Troponin <0.01 <0.01 ng/mL   Brain Natriuretic Peptide    Collection Time: 04/26/20 10:48 PM   Result Value Ref Range    Pro- (H) 0 - 449 pg/mL   TYPE AND SCREEN    Collection Time: 04/26/20 10:48 PM   Result Value Ref Range    ABO/Rh O NEG     Antibody Screen NEG    EKG 12 Lead    Collection Time: 04/26/20 11:02 PM   Result Value Ref Range    Ventricular Rate 63 BPM    Atrial Rate 63 BPM    P-R Interval 130 ms    QRS Duration 102 ms    Q-T Interval 416 ms    QTc Calculation (Bazett) 425 ms    P Axis 34 degrees    R Axis -33 degrees    T Axis 77 degrees    Diagnosis       Normal sinus rhythmLeft axis deviationIncomplete right bundle branch blockConfirmed by Renea Mayo MD, Jyl Puls ([id number]) on 4/27/2020 5:22:37 PM   Urinalysis Reflex to Culture    Collection Time: 04/27/20  2:10 AM   Result Value Ref Range    Color, UA YELLOW Straw/Yellow    Clarity, UA CLOUDY (A) Clear    Glucose, Ur Negative Negative mg/dL    Bilirubin Urine Negative Negative    Ketones, Urine Negative Negative mg/dL    Specific Gravity, UA 1.010 1.005 - 1.030    Blood, Urine Negative Negative    pH, UA 7.5 5.0 - 8.0    Protein, UA Negative Negative mg/dL    Urobilinogen, Urine 0.2 <2.0 E.U./dL    Nitrite, Urine Negative Negative    Leukocyte Esterase, Urine SMALL (A) Negative    Microscopic Examination YES     Urine Type Voided     Urine Reflex to Culture Yes    Culture, Urine    Collection Time: 04/27/20  2:10 AM   Result Value Ref Range    Urine Culture, Routine       >50,000 CFU/ml mixed skin/urogenital deion.  No further workup   Microscopic Urinalysis Collection Time: 04/27/20  2:10 AM   Result Value Ref Range    Bacteria, UA RARE (A) None Seen /HPF    Hyaline Casts, UA 2 0 - 8 /LPF    WBC, UA 7 (H) 0 - 5 /HPF    RBC, UA 3 0 - 4 /HPF    Epithelial Cells, UA 2 0 - 5 /HPF       ASSESSMENT AND PLAN     Active Problems:    Hyperlipidemia    Depression    HTN (hypertension)    Hypothyroid    Atrial fibrillation (HCC)    Persistent atrial fibrillation    Paroxysmal atrial fibrillation (HCC)    Fall from slip, trip, or stumble, initial encounter    Subdural hematoma ([facility] [address]    Ataxia  Resolved Problems:    * No resolved hospital problems.  *      PT OT SLP    SNF Placement

## 2020-05-12 ENCOUNTER — APPOINTMENT (OUTPATIENT)
Dept: CT IMAGING | Age: 85
End: 2020-05-12
Payer: COMMERCIAL

## 2020-05-12 ENCOUNTER — APPOINTMENT (OUTPATIENT)
Dept: GENERAL RADIOLOGY | Age: 85
End: 2020-05-12
Payer: COMMERCIAL

## 2020-05-12 ENCOUNTER — HOSPITAL ENCOUNTER (EMERGENCY)
Age: 85
Discharge: HOME | End: 2020-05-12
Attending: EMERGENCY MEDICINE
Payer: COMMERCIAL

## 2020-05-12 VITALS
TEMPERATURE: 99 F | OXYGEN SATURATION: 95 % | HEIGHT: 65 IN | SYSTOLIC BLOOD PRESSURE: 113 MMHG | RESPIRATION RATE: 15 BRPM | HEART RATE: 60 BPM | WEIGHT: 165 LBS | BODY MASS INDEX: 27.49 KG/M2 | DIASTOLIC BLOOD PRESSURE: 44 MMHG

## 2020-05-12 PROCEDURE — 72125 CT NECK SPINE W/O DYE: CPT

## 2020-05-12 PROCEDURE — 72128 CT CHEST SPINE W/O DYE: CPT

## 2020-05-12 PROCEDURE — 72131 CT LUMBAR SPINE W/O DYE: CPT

## 2020-05-12 PROCEDURE — 70450 CT HEAD/BRAIN W/O DYE: CPT

## 2020-05-12 PROCEDURE — 6370000000 HC RX 637 (ALT 250 FOR IP): Performed by: PHYSICIAN ASSISTANT

## 2020-05-12 PROCEDURE — 72170 X-RAY EXAM OF PELVIS: CPT

## 2020-05-12 PROCEDURE — 99284 EMERGENCY DEPT VISIT MOD MDM: CPT

## 2020-05-12 RX ORDER — HYDROCODONE BITARTRATE AND ACETAMINOPHEN 5; 325 MG/1; MG/1
1 TABLET ORAL ONCE
Status: COMPLETED | OUTPATIENT
Start: 2020-05-12 | End: 2020-05-12

## 2020-05-12 RX ORDER — ONDANSETRON 4 MG/1
8 TABLET, ORALLY DISINTEGRATING ORAL ONCE
Status: COMPLETED | OUTPATIENT
Start: 2020-05-12 | End: 2020-05-12

## 2020-05-12 RX ADMIN — ONDANSETRON 8 MG: 4 TABLET, ORALLY DISINTEGRATING ORAL at 08:29

## 2020-05-12 RX ADMIN — HYDROCODONE BITARTRATE AND ACETAMINOPHEN 1 TABLET: 5; 325 TABLET ORAL at 08:29

## 2020-05-12 ASSESSMENT — PAIN SCALES - GENERAL
PAINLEVEL_OUTOF10: 0
PAINLEVEL_OUTOF10: 8
PAINLEVEL_OUTOF10: 8

## 2020-05-12 ASSESSMENT — ENCOUNTER SYMPTOMS
COUGH: 0
BACK PAIN: 1
RESPIRATORY NEGATIVE: 1
ABDOMINAL PAIN: 0
DIARRHEA: 0
COLOR CHANGE: 0
CONSTIPATION: 0
VOMITING: 0
NAUSEA: 0
PHOTOPHOBIA: 0
SHORTNESS OF BREATH: 0

## 2020-05-12 ASSESSMENT — PAIN DESCRIPTION - PAIN TYPE
TYPE: ACUTE PAIN
TYPE: ACUTE PAIN

## 2020-05-12 ASSESSMENT — PAIN DESCRIPTION - LOCATION: LOCATION: BACK

## 2020-05-12 NOTE — ED PROVIDER NOTES
[facility]        Pt Name: Zaki Grimes  MRN: [id number]  [place] 10/29/1929  Date of evaluation: 5/12/2020  Provider: ADRIANA Pace  PCP: Bertin Willis MD    Evaluated by BENJIE only. Attending physician available for consultation as needed. CHIEF COMPLAINT       Chief Complaint   Patient presents with    Fall     Pt brought in per [facility], [facility], pt had a fall in her room this am, per nurse she was walking in her room when she heard her fall, pt c/o mid back pain as well as neck pain. Pt AAO x 3. Pt did not hit her head or have a LOC. HISTORY OF PRESENT ILLNESS   (Location, Timing/Onset, Context/Setting, Quality, Duration, Modifying Factors, Severity, Associated Signs and Symptoms)  Note limiting factors. Zaki Grimes is a 80 y.o. female with past medical medical history of atrial fibrillation anticoagulated with aspirin/Plavix, hyperlipidemia, hypertension, thrombocytopenia and pulmonary hypertension who presents to the ED with complaint of a fall. Patient had a witnessed fall this morning at [address]. Patient was walking back to her room when nursing staff states she fell. Apparently is been complaining of pain to her neck and back. Patient states she did not hit her head. Denies headache. Denies loss of conscious, visual changes, speech services or numbness/tingling. Denies lightheadedness or dizziness prior to falling. Denies chest pain, shortness of breath, abdominal pain, nausea/vomiting, urinary symptoms or changes in bowel movements. Denies any other injury or trauma throughout. Denies previous injury or trauma to the neck or back in the past.  Patient states aching pain rated 8/10 to her neck or back. Denies taking any over-the-counter medication for symptom control.       Nursing Notes were all reviewed and agreed with or any Take 650 mg by mouth every 6 hours as needed for Pain or Fever    AMIODARONE (CORDARONE) 200 MG TABLET    Take 0.5 tablets by mouth daily    ASPIRIN EC 81 MG EC TABLET    Take 1 tablet by mouth daily    ATENOLOL (TENORMIN) 25 MG TABLET    TAKE ONE TABLET BY MOUTH EVERY DAY    BRIMONIDINE (ALPHAGAN P) 0.1 % SOLN    Place 1 drop into both eyes every 8 hours. BROMFENAC (PROLENSA) 0.07 % SOLN    Place 1 drop into both eyes daily    CELEXA 40 MG TABLET    TAKE ONE TABLET BY MOUTH EVERY DAY    CHOLECALCIFEROL (VITAMIN D3) 5000 UNITS TABS    Take 1 tablet by mouth every other day     CITALOPRAM (CELEXA) 40 MG TABLET    TAKE ONE TABLET BY MOUTH EVERY DAY    CLOPIDOGREL (PLAVIX) 75 MG TABLET    Take 1 tablet by mouth daily    DICLOFENAC SODIUM (VOLTAREN) 1 % GEL    Apply 2 g topically 2 times daily as needed for Pain    DORZOLAMIDE-TIMOLOL (COSOPT) 22.3-6.8 MG/ML OPHTHALMIC SOLUTION    Place 1 drop into both eyes 2 times daily. DORZOLAMIDE-TIMOLOL (COSOPT) 22.3-6.8 MG/ML OPHTHALMIC SOLUTION    Place 1 drop into both eyes 2 times daily. HYDRALAZINE (APRESOLINE) 25 MG TABLET    Take 1 tablet by mouth every 8 hours    HYDROCORTISONE 2.5 % CREAM    Apply topically as needed     HYDROXYZINE (ATARAX) 10 MG TABLET    Take 1 tablet by mouth every 8 hours as needed for Itching. LATANOPROST (XALATAN) 0.005 % OPHTHALMIC SOLUTION    Place 1 drop into both eyes nightly. LATANOPROST (XALATAN) 0.005 % OPHTHALMIC SOLUTION    Place 1 drop into both eyes nightly    LEVOTHYROXINE (SYNTHROID) 75 MCG TABLET    Take 75 mcg by mouth daily.       LEVOTHYROXINE (SYNTHROID) 75 MCG TABLET    TAKE 1 TABLET BY MOUTH EVERY DAY    LIFT CHAIR MISC    by Does not apply route    METHAZOLAMIDE (NEPTAZANE) 50 MG TABLET    Take 50 mg by mouth 2 times daily    SENNA (SENOKOT) 8.6 MG TABLET    Take 2 tablets by mouth daily     SENNOSIDES-DOCUSATE SODIUM (SENOKOT-S) 8.6-50 MG TABLET    Take 2 tablets by mouth daily    SODIUM CHLORIDE (OCEAN, heart sounds. No murmur. No friction rub. No gallop. Pulmonary:      Effort: Pulmonary effort is normal. No respiratory distress. Breath sounds: Normal breath sounds. No stridor. No wheezing, rhonchi or rales. Chest:      Chest wall: No tenderness. Abdominal:      General: Abdomen is flat. Bowel sounds are normal. There is no distension. Palpations: Abdomen is soft. There is no mass. Tenderness: There is no abdominal tenderness. There is no right CVA tenderness, left CVA tenderness, guarding or rebound. Hernia: No hernia is present. Musculoskeletal: Normal range of motion. Comments: Upon examination patient is tender palpation of the midline cervical, thoracic and lumbar spine. No crepitus or step-off. No skin changes. No rashes or lesions. No CVA tenderness. No anterior chest wall tenderness. No rib cage tenderness bilaterally. No pelvis instability. No TTP to the upper and lower extremities throughout. Full range of motion strength of the upper and lower extremities throughout. Distal neurovascular intact. Gait deferred at this time. Skin:     General: Skin is warm and dry. Coloration: Skin is not pale. Findings: No erythema. Neurological:      General: No focal deficit present. Mental Status: She is alert. GCS: GCS eye subscore is 4. GCS verbal subscore is 5. GCS motor subscore is 6. Cranial Nerves: Cranial nerves are intact. No cranial nerve deficit. Sensory: No sensory deficit. Motor: Motor function is intact. Comments: Gait deferred at this time. Psychiatric:         Behavior: Behavior normal.         DIAGNOSTIC RESULTS   LABS:    Labs Reviewed - No data to display    All other labs were within normal range or not returned as of this dictation. EKG: All EKG's are interpreted by the Emergency Department Physician in the absence of a cardiologist.  Please see their note for interpretation of EKG.       RADIOLOGY: Non-plain film images such as CT, Ultrasound and MRI are read by the radiologist. Joy Fill radiographic images are visualized and preliminarily interpreted by the ED Provider with the below findings:        Interpretation per the Radiologist below, if available at the time of this note:    CT Lumbar Spine WO Contrast   Final Result   No acute traumatic abnormality of the cervical, thoracic or lumbar spine. No   evidence of fracture. Partially imaged dermoid cyst within the right-sided pelvis, measuring at   least 10 cm. Partially imaged mass associated the dome of the liver also   again noted containing fat and calcium. This may be a separate dermoid. Metastatic liposarcoma also consideration. CT Thoracic Spine WO Contrast   Final Result   No acute traumatic abnormality of the cervical, thoracic or lumbar spine. No   evidence of fracture. Partially imaged dermoid cyst within the right-sided pelvis, measuring at   least 10 cm. Partially imaged mass associated the dome of the liver also   again noted containing fat and calcium. This may be a separate dermoid. Metastatic liposarcoma also consideration. CT CERVICAL SPINE WO CONTRAST   Final Result   No acute traumatic abnormality of the cervical, thoracic or lumbar spine. No   evidence of fracture. Partially imaged dermoid cyst within the right-sided pelvis, measuring at   least 10 cm. Partially imaged mass associated the dome of the liver also   again noted containing fat and calcium. This may be a separate dermoid. Metastatic liposarcoma also consideration. CT HEAD WO CONTRAST   Final Result   No acute intracranial abnormality. Stable senescent changes. XR PELVIS (1-2 VIEWS)   Final Result   1. No acute abnormality. No results found.         PROCEDURES   Unless otherwise noted below, none     Procedures    CRITICAL CARE TIME   N/A    CONSULTS:  None      EMERGENCY DEPARTMENT COURSE and encounter    3. Abnormal CT of the abdomen          DISPOSITION/PLAN   DISPOSITION Decision To Discharge 05/12/2020 09:28:19 AM      PATIENT REFERREDTO:  Brandy Hong MD  [facility] [address]  [phone]    Schedule an appointment as soon as possible for a visit   For a Re-check in   5-7   days.     [facility] Emergency Department  [facility] [address]  [phone]  Go to   If symptoms worsen      DISCHARGE MEDICATIONS:  New Prescriptions    No medications on file       DISCONTINUED MEDICATIONS:  Discontinued Medications    No medications on file              (Please note that portions of this note were completed with a voice recognition program.  Efforts were made to edit the dictations but occasionally words are mis-transcribed.)    ADRIANA Floyd (electronically signed)          ADRIANA Szymanski  05/12/20 1002

## 2020-05-12 NOTE — ED NOTES
Pt arrives from [facility] pt had a mechanical fall in her room, pt denies syncope, pt c/o mid back and neck pain. Pt rates pain 8/10. Pt has no s/s of distress noted. [facility] into room when she heard her fall. Pt uses a walker.        Paolo Haque RN  05/12/20 0800

## 2020-06-04 NOTE — PROGRESS NOTES
Patient seen , discharge dictated scripts given , arrangements made , REA completed .  Discussed with nursing staff  And   If applicable ,  Discussed with  Patient's family , all questions answered and concerns addressed  When applicable

## 2020-06-05 NOTE — DISCHARGE SUMMARY
potassium 4.0, chloride 101,  CO2 20, BUN 11, creatinine 1.0, anion gap is 12, GFR is 52. Blood sugar  is 105, total protein 6.8. White blood cell count 7.3, hemoglobin and  hematocrit 15.6 and 47, platelet count 760. PT/INR is 11.7 and 1.01. Urinalysis shows 7 wbcs and some bacteria. Chest x-ray was negative. X-ray of the radius and ulna shows no fracture or malalignment. The  patient did have CT evidence of subdural hematoma, which was not  critical.  It was only 8 mm acute in the right parafalcine region. The  patient underwent another repeat CT that was essentially stable, which  was done 12 hours later. General condition was stabilized. The patient  was discharged in stable condition with PT and OT. The patient also had  a CT scan of the lumbar and thoracic spine and there was no broken bone. No acute traumatic abnormality, partially imaged dermoid cyst within the  right-sided pelvis measuring at least 10 cm, partially imaged mass  associated with dome of the liver also noted again containing fat and  calcium, which maybe a separate dermoid. After PT/OT evaluation, the  patient was discharged in stable condition. My [place] made necessary  arrangement for the patient to go to [place]. [facility].         Jed Rosen MD    D: 06/04/2020 11:06:11       T: 06/05/2020 2:35:43     [id number]  Job#: [id number]     Doc#: [id number]    CC:

## 2020-06-08 ENCOUNTER — APPOINTMENT (OUTPATIENT)
Dept: CT IMAGING | Age: 85
End: 2020-06-08
Payer: COMMERCIAL

## 2020-06-08 ENCOUNTER — HOSPITAL ENCOUNTER (EMERGENCY)
Age: 85
Discharge: HOME | End: 2020-06-08
Attending: EMERGENCY MEDICINE
Payer: COMMERCIAL

## 2020-06-08 VITALS
RESPIRATION RATE: 28 BRPM | OXYGEN SATURATION: 96 % | HEART RATE: 66 BPM | SYSTOLIC BLOOD PRESSURE: 152 MMHG | DIASTOLIC BLOOD PRESSURE: 62 MMHG | TEMPERATURE: 98.1 F

## 2020-06-08 PROCEDURE — 72125 CT NECK SPINE W/O DYE: CPT

## 2020-06-08 PROCEDURE — 70450 CT HEAD/BRAIN W/O DYE: CPT

## 2020-06-08 PROCEDURE — 12001 RPR S/N/AX/GEN/TRNK 2.5CM/<: CPT

## 2020-06-08 PROCEDURE — 99284 EMERGENCY DEPT VISIT MOD MDM: CPT

## 2020-06-08 ASSESSMENT — PAIN DESCRIPTION - LOCATION: LOCATION: HEAD

## 2020-06-08 ASSESSMENT — PAIN SCALES - GENERAL: PAINLEVEL_OUTOF10: 10

## 2020-06-08 ASSESSMENT — PAIN DESCRIPTION - PAIN TYPE: TYPE: ACUTE PAIN

## 2020-06-08 NOTE — ED PROVIDER NOTES
 Elevated blood sugar 244.9    Fatigue 02/252/2010    Glaucoma     Hyperglycemia     Hyperlipidemia     Hypertension     Hypothyroidism     Impaired fasting blood sugar 05/04/2011    Impaired fasting glucose     Pulmonary hypertension, primary ([facility] [address] 04/19/2010    Thrombocytopenia ([facility]     Vitamin D deficiency     Vitamin D deficiency 07/06/2010         SURGICAL HISTORY     Past Surgical History:   Procedure Laterality Date    APPENDECTOMY      OTHER SURGICAL HISTORY  07/05/2018    RIGHT hip gamma nail     OVARY SURGERY      TONSILLECTOMY           CURRENTMEDICATIONS       Previous Medications    ACETAMINOPHEN (TYLENOL) 325 MG TABLET    Take 650 mg by mouth every 6 hours as needed for Pain or Fever    AMIODARONE (CORDARONE) 200 MG TABLET    Take 0.5 tablets by mouth daily    ASPIRIN EC 81 MG EC TABLET    Take 1 tablet by mouth daily    ATENOLOL (TENORMIN) 25 MG TABLET    TAKE ONE TABLET BY MOUTH EVERY DAY    BRIMONIDINE (ALPHAGAN P) 0.1 % SOLN    Place 1 drop into both eyes every 8 hours. BROMFENAC (PROLENSA) 0.07 % SOLN    Place 1 drop into both eyes daily    CELEXA 40 MG TABLET    TAKE ONE TABLET BY MOUTH EVERY DAY    CHOLECALCIFEROL (VITAMIN D3) 5000 UNITS TABS    Take 1 tablet by mouth every other day     CITALOPRAM (CELEXA) 40 MG TABLET    TAKE ONE TABLET BY MOUTH EVERY DAY    CLOPIDOGREL (PLAVIX) 75 MG TABLET    Take 1 tablet by mouth daily    DICLOFENAC SODIUM (VOLTAREN) 1 % GEL    Apply 2 g topically 2 times daily as needed for Pain    DORZOLAMIDE-TIMOLOL (COSOPT) 22.3-6.8 MG/ML OPHTHALMIC SOLUTION    Place 1 drop into both eyes 2 times daily. DORZOLAMIDE-TIMOLOL (COSOPT) 22.3-6.8 MG/ML OPHTHALMIC SOLUTION    Place 1 drop into both eyes 2 times daily.       HYDRALAZINE (APRESOLINE) 25 MG TABLET    Take 1 tablet by mouth every 8 hours    HYDROCORTISONE 2.5 % CREAM    Apply topically as needed     HYDROXYZINE (ATARAX) 10 MG TABLET    Take 1 tablet by mouth every 8 hours as needed for

## 2020-06-08 NOTE — ED NOTES
Assisted pt with bed pan pt tolerated well, rachel care completed, another warm blanket given. Pt updated on plan for discharge. No s/s of distress noted. Pt AAO x 3.         Ambrocio Pink RN  06/08/20 7707

## 2020-06-08 NOTE — ED NOTES
Report called to Nolan at [facility],  Setting up transport. Daughter remains at bedside.       Ambrocio Pink RN  06/08/20 6385

## 2020-06-08 NOTE — ED NOTES
Report given to transport team, patient discharged back to [address] [place] [address], RN  06/08/20 2495

## 2020-10-26 ENCOUNTER — NURSE ONLY (OUTPATIENT)
Dept: CARDIOLOGY CLINIC | Age: 85
End: 2020-10-26
Payer: COMMERCIAL

## 2020-10-26 NOTE — PROGRESS NOTES
We received a remote transmission form patient's monitor at home. Remote Linq report shows no arrhythmias. Patient has not had monitor hooked up since April. Many recordings will not be visible. She will need to come in office for interrogation to view any recordings if any. Will reach out to patient to schedule an appt. EP physician to review. We will continue to monitor remotely.

## 2020-10-29 PROCEDURE — G2066 INTER DEVC REMOTE 30D: HCPCS | Performed by: INTERNAL MEDICINE

## 2020-10-29 PROCEDURE — 93298 REM INTERROG DEV EVAL SCRMS: CPT | Performed by: INTERNAL MEDICINE

## 2020-11-30 ENCOUNTER — NURSE ONLY (OUTPATIENT)
Dept: CARDIOLOGY CLINIC | Age: 85
End: 2020-11-30
Payer: COMMERCIAL

## 2020-11-30 PROCEDURE — G2066 INTER DEVC REMOTE 30D: HCPCS | Performed by: INTERNAL MEDICINE

## 2020-11-30 PROCEDURE — 93298 REM INTERROG DEV EVAL SCRMS: CPT | Performed by: INTERNAL MEDICINE

## 2020-11-30 NOTE — LETTER
[facility] [phone]  [address] [facility],[address] [phone]    Pacemaker/Defibrillator Clinic          11/30/20        [address] [place] [id number]        Dear Mani May    This letter is to inform you that we received the transmission from your monitor at home that checks your implanted heart device. The next date your monitor will automatically transmit will be 1-4-21. If your report needs attention we will notify you. Your device and monitor are wireless and most transmit cellularly, but please periodically check your monitor is still plugged in to the electrical outlet. If you still use the telephone land line to send please ensure the connection to the phone penelope is secure. This will help to ensure successful automatic transmissions in the future. Also, the monitor needs to be close to you while sleeping at night. Please be aware that the remote device transmission sites are periodically monitored only during regular business hours during which simultaneous in-office device clinics are being run. If your transmission requires attention, we will contact you as soon as possible. Thank you.             Erinn 81

## 2021-02-04 ENCOUNTER — APPOINTMENT (OUTPATIENT)
Dept: CT IMAGING | Age: 86
End: 2021-02-04
Payer: MEDICARE

## 2021-02-04 ENCOUNTER — HOSPITAL ENCOUNTER (OUTPATIENT)
Age: 86
Setting detail: OBSERVATION
Discharge: HOME | End: 2021-02-05
Attending: EMERGENCY MEDICINE | Admitting: INTERNAL MEDICINE
Payer: MEDICARE

## 2021-02-04 ENCOUNTER — APPOINTMENT (OUTPATIENT)
Dept: GENERAL RADIOLOGY | Age: 86
End: 2021-02-04
Payer: MEDICARE

## 2021-02-04 DIAGNOSIS — R16.0 LIVER MASS: ICD-10-CM

## 2021-02-04 DIAGNOSIS — R94.31 ACUTE ELECTROCARDIOGRAM CHANGES: ICD-10-CM

## 2021-02-04 DIAGNOSIS — R55 SYNCOPE AND COLLAPSE: Primary | ICD-10-CM

## 2021-02-04 LAB
A/G RATIO: 1.7 (ref 1.1–2.2)
ALBUMIN SERPL-MCNC: 4.3 G/DL (ref 3.4–5)
ALP BLD-CCNC: 92 U/L (ref 40–129)
ALT SERPL-CCNC: 8 U/L (ref 10–40)
ANION GAP SERPL CALCULATED.3IONS-SCNC: 11 MMOL/L (ref 3–16)
AST SERPL-CCNC: 15 U/L (ref 15–37)
BACTERIA: ABNORMAL /HPF
BASOPHILS ABSOLUTE: 0 K/UL (ref 0–0.2)
BASOPHILS RELATIVE PERCENT: 0.5 %
BILIRUB SERPL-MCNC: 0.3 MG/DL (ref 0–1)
BILIRUBIN URINE: NEGATIVE
BLOOD, URINE: NEGATIVE
BUN BLDV-MCNC: 18 MG/DL (ref 7–20)
CALCIUM SERPL-MCNC: 9.4 MG/DL (ref 8.3–10.6)
CHLORIDE BLD-SCNC: 102 MMOL/L (ref 99–110)
CLARITY: ABNORMAL
CO2: 23 MMOL/L (ref 21–32)
COLOR: YELLOW
CREAT SERPL-MCNC: 1.2 MG/DL (ref 0.6–1.2)
EOSINOPHILS ABSOLUTE: 0.1 K/UL (ref 0–0.6)
EOSINOPHILS RELATIVE PERCENT: 1.7 %
EPITHELIAL CELLS, UA: 1 /HPF (ref 0–5)
GFR AFRICAN AMERICAN: 51
GFR NON-AFRICAN AMERICAN: 42
GLOBULIN: 2.5 G/DL
GLUCOSE BLD-MCNC: 151 MG/DL (ref 70–99)
GLUCOSE URINE: NEGATIVE MG/DL
HCT VFR BLD CALC: 40.6 % (ref 36–48)
HEMOGLOBIN: 12.9 G/DL (ref 12–16)
HYALINE CASTS: 0 /LPF (ref 0–8)
KETONES, URINE: NEGATIVE MG/DL
LEUKOCYTE ESTERASE, URINE: ABNORMAL
LYMPHOCYTES ABSOLUTE: 1.2 K/UL (ref 1–5.1)
LYMPHOCYTES RELATIVE PERCENT: 20.2 %
MCH RBC QN AUTO: 31 PG (ref 26–34)
MCHC RBC AUTO-ENTMCNC: 31.9 G/DL (ref 31–36)
MCV RBC AUTO: 97.1 FL (ref 80–100)
MICROSCOPIC EXAMINATION: YES
MONOCYTES ABSOLUTE: 0.3 K/UL (ref 0–1.3)
MONOCYTES RELATIVE PERCENT: 4.8 %
NEUTROPHILS ABSOLUTE: 4.2 K/UL (ref 1.7–7.7)
NEUTROPHILS RELATIVE PERCENT: 72.8 %
NITRITE, URINE: NEGATIVE
PDW BLD-RTO: 13.1 % (ref 12.4–15.4)
PH UA: 6.5 (ref 5–8)
PLATELET # BLD: 145 K/UL (ref 135–450)
PMV BLD AUTO: 9.3 FL (ref 5–10.5)
POTASSIUM SERPL-SCNC: 4.4 MMOL/L (ref 3.5–5.1)
PROTEIN UA: NEGATIVE MG/DL
RBC # BLD: 4.17 M/UL (ref 4–5.2)
RBC UA: ABNORMAL /HPF (ref 0–4)
SODIUM BLD-SCNC: 136 MMOL/L (ref 136–145)
SPECIFIC GRAVITY UA: 1.01 (ref 1–1.03)
TOTAL PROTEIN: 6.8 G/DL (ref 6.4–8.2)
TROPONIN: <0.01 NG/ML
URINE REFLEX TO CULTURE: ABNORMAL
URINE TYPE: ABNORMAL
UROBILINOGEN, URINE: 0.2 E.U./DL
WBC # BLD: 5.7 K/UL (ref 4–11)
WBC UA: 2 /HPF (ref 0–5)

## 2021-02-04 PROCEDURE — 6360000004 HC RX CONTRAST MEDICATION: Performed by: PHYSICIAN ASSISTANT

## 2021-02-04 PROCEDURE — 93005 ELECTROCARDIOGRAM TRACING: CPT | Performed by: PHYSICIAN ASSISTANT

## 2021-02-04 PROCEDURE — 85025 COMPLETE CBC W/AUTO DIFF WBC: CPT

## 2021-02-04 PROCEDURE — 80053 COMPREHEN METABOLIC PANEL: CPT

## 2021-02-04 PROCEDURE — 93971 EXTREMITY STUDY: CPT

## 2021-02-04 PROCEDURE — 81001 URINALYSIS AUTO W/SCOPE: CPT

## 2021-02-04 PROCEDURE — 99283 EMERGENCY DEPT VISIT LOW MDM: CPT

## 2021-02-04 PROCEDURE — 84484 ASSAY OF TROPONIN QUANT: CPT

## 2021-02-04 PROCEDURE — 2580000003 HC RX 258: Performed by: INTERNAL MEDICINE

## 2021-02-04 PROCEDURE — 71260 CT THORAX DX C+: CPT

## 2021-02-04 PROCEDURE — G0378 HOSPITAL OBSERVATION PER HR: HCPCS

## 2021-02-04 PROCEDURE — 6370000000 HC RX 637 (ALT 250 FOR IP): Performed by: INTERNAL MEDICINE

## 2021-02-04 PROCEDURE — 71045 X-RAY EXAM CHEST 1 VIEW: CPT

## 2021-02-04 RX ORDER — LATANOPROST 50 UG/ML
1 SOLUTION/ DROPS OPHTHALMIC NIGHTLY
Status: DISCONTINUED | OUTPATIENT
Start: 2021-02-04 | End: 2021-02-04

## 2021-02-04 RX ORDER — SENNA AND DOCUSATE SODIUM 50; 8.6 MG/1; MG/1
2 TABLET, FILM COATED ORAL DAILY
Status: DISCONTINUED | OUTPATIENT
Start: 2021-02-05 | End: 2021-02-04

## 2021-02-04 RX ORDER — TRAZODONE HYDROCHLORIDE 50 MG/1
50 TABLET ORAL NIGHTLY
Status: DISCONTINUED | OUTPATIENT
Start: 2021-02-04 | End: 2021-02-06 | Stop reason: HOSPADM

## 2021-02-04 RX ORDER — BRIMONIDINE TARTRATE 2 MG/ML
1 SOLUTION/ DROPS OPHTHALMIC 2 TIMES DAILY
Status: DISCONTINUED | OUTPATIENT
Start: 2021-02-04 | End: 2021-02-04

## 2021-02-04 RX ORDER — ATENOLOL 25 MG/1
1 TABLET ORAL DAILY
Status: DISCONTINUED | OUTPATIENT
Start: 2021-02-05 | End: 2021-02-04 | Stop reason: ALTCHOICE

## 2021-02-04 RX ORDER — METHAZOLAMIDE 50 MG/1
50 TABLET ORAL 2 TIMES DAILY
Status: DISCONTINUED | OUTPATIENT
Start: 2021-02-04 | End: 2021-02-04

## 2021-02-04 RX ORDER — BROMFENAC 1.03 MG/ML
1 SOLUTION/ DROPS OPHTHALMIC DAILY
Status: DISCONTINUED | OUTPATIENT
Start: 2021-02-05 | End: 2021-02-04

## 2021-02-04 RX ORDER — DORZOLAMIDE HYDROCHLORIDE AND TIMOLOL MALEATE 20; 5 MG/ML; MG/ML
1 SOLUTION/ DROPS OPHTHALMIC 2 TIMES DAILY
Status: DISCONTINUED | OUTPATIENT
Start: 2021-02-04 | End: 2021-02-04

## 2021-02-04 RX ORDER — CLOPIDOGREL BISULFATE 75 MG/1
75 TABLET ORAL DAILY
Status: DISCONTINUED | OUTPATIENT
Start: 2021-02-05 | End: 2021-02-06 | Stop reason: HOSPADM

## 2021-02-04 RX ORDER — POTASSIUM CHLORIDE 750 MG/1
10 CAPSULE, EXTENDED RELEASE ORAL DAILY
COMMUNITY

## 2021-02-04 RX ORDER — ACETAMINOPHEN 325 MG/1
650 TABLET ORAL EVERY 6 HOURS PRN
Status: DISCONTINUED | OUTPATIENT
Start: 2021-02-04 | End: 2021-02-06 | Stop reason: HOSPADM

## 2021-02-04 RX ORDER — SENNA PLUS 8.6 MG/1
2 TABLET ORAL DAILY
Status: DISCONTINUED | OUTPATIENT
Start: 2021-02-05 | End: 2021-02-06 | Stop reason: HOSPADM

## 2021-02-04 RX ORDER — TRIAMTERENE AND HYDROCHLOROTHIAZIDE 37.5; 25 MG/1; MG/1
1 TABLET ORAL DAILY
Status: DISCONTINUED | OUTPATIENT
Start: 2021-02-05 | End: 2021-02-04

## 2021-02-04 RX ORDER — CITALOPRAM 40 MG/1
1 TABLET ORAL DAILY
Status: DISCONTINUED | OUTPATIENT
Start: 2021-02-05 | End: 2021-02-04

## 2021-02-04 RX ORDER — SODIUM CHLORIDE 9 MG/ML
INJECTION, SOLUTION INTRAVENOUS CONTINUOUS
Status: DISCONTINUED | OUTPATIENT
Start: 2021-02-04 | End: 2021-02-05

## 2021-02-04 RX ORDER — OMEPRAZOLE 20 MG/1
20 CAPSULE, DELAYED RELEASE ORAL
COMMUNITY

## 2021-02-04 RX ORDER — LEVOTHYROXINE SODIUM 0.07 MG/1
75 TABLET ORAL DAILY
Status: DISCONTINUED | OUTPATIENT
Start: 2021-02-05 | End: 2021-02-04

## 2021-02-04 RX ORDER — FUROSEMIDE 40 MG/1
40 TABLET ORAL DAILY
Status: ON HOLD | COMMUNITY
End: 2021-02-05 | Stop reason: HOSPADM

## 2021-02-04 RX ORDER — TRIAMCINOLONE ACETONIDE 1 MG/G
CREAM TOPICAL 2 TIMES DAILY
Status: DISCONTINUED | OUTPATIENT
Start: 2021-02-04 | End: 2021-02-04

## 2021-02-04 RX ORDER — CITALOPRAM 20 MG/1
40 TABLET ORAL DAILY
Status: DISCONTINUED | OUTPATIENT
Start: 2021-02-05 | End: 2021-02-04

## 2021-02-04 RX ORDER — LEVOTHYROXINE SODIUM 0.07 MG/1
75 TABLET ORAL DAILY
Status: DISCONTINUED | OUTPATIENT
Start: 2021-02-05 | End: 2021-02-06 | Stop reason: HOSPADM

## 2021-02-04 RX ORDER — HYDRALAZINE HYDROCHLORIDE 25 MG/1
25 TABLET, FILM COATED ORAL EVERY 8 HOURS SCHEDULED
Status: DISCONTINUED | OUTPATIENT
Start: 2021-02-04 | End: 2021-02-06 | Stop reason: HOSPADM

## 2021-02-04 RX ORDER — ASPIRIN 81 MG/1
81 TABLET ORAL DAILY
Status: DISCONTINUED | OUTPATIENT
Start: 2021-02-05 | End: 2021-02-04 | Stop reason: ALTCHOICE

## 2021-02-04 RX ORDER — LANOLIN ALCOHOL/MO/W.PET/CERES
9 CREAM (GRAM) TOPICAL NIGHTLY
COMMUNITY

## 2021-02-04 RX ORDER — AMIODARONE HYDROCHLORIDE 200 MG/1
100 TABLET ORAL DAILY
Status: DISCONTINUED | OUTPATIENT
Start: 2021-02-05 | End: 2021-02-06 | Stop reason: HOSPADM

## 2021-02-04 RX ORDER — HYDROXYZINE HYDROCHLORIDE 10 MG/1
10 TABLET, FILM COATED ORAL EVERY 8 HOURS PRN
Status: DISCONTINUED | OUTPATIENT
Start: 2021-02-04 | End: 2021-02-04

## 2021-02-04 RX ADMIN — IOPAMIDOL 75 ML: 755 INJECTION, SOLUTION INTRAVENOUS at 11:45

## 2021-02-04 RX ADMIN — SODIUM CHLORIDE: 9 INJECTION, SOLUTION INTRAVENOUS at 22:29

## 2021-02-04 RX ADMIN — TRAZODONE HYDROCHLORIDE 50 MG: 50 TABLET ORAL at 22:29

## 2021-02-04 RX ADMIN — HYDRALAZINE HYDROCHLORIDE 25 MG: 25 TABLET, FILM COATED ORAL at 22:29

## 2021-02-04 ASSESSMENT — PAIN SCALES - GENERAL: PAINLEVEL_OUTOF10: 0

## 2021-02-04 NOTE — ED PROVIDER NOTES
I independently performed a history and physical on Lucy Aponte. All diagnostic, treatment, and disposition decisions were made by myself in conjunction with the advanced practice provider. Briefly, this is a 80 y.o. female here for evaluation after syncopal episode at night healthy question developed. She was at breakfast and suddenly felt weak, followed by syncopal episode. She was sitting in a chair and did not fall to the ground or injure herself. She feels better at this time denies chest pain shortness of breath. .    On exam, patient is alert and awake. Heart is regular rate and rhythm. Lungs are clear to auscultation bilaterally. She has edema to her left leg but no significant edema in the right leg. EKG  The Ekg interpreted by me in the absence of a cardiologist shows. normal sinus rhythm with a rate of 60  Axis is   Left axis deviation  QTc is  normal  Intervals and Durations are unremarkable. T wave inversions in anterolateral leads that are new in comparison to previous EKG from April 2020. No other significant changes in comparison to April 2020. MDM  Patient's syncopal episode today would be concerning enough given her age comorbidities. In addition, her new T wave inversions in anterolateral leads are suggestive of a cardiogenic cause. She will need admission for further evaluation and treatment. FINAL IMPRESSION  1. Syncope and collapse    2. Acute electrocardiogram changes    3. Liver mass        Pulse 57, temperature 97.2 °F (36.2 °C), temperature source Oral, resp. rate 14, height 5' 4\" (1.626 m), weight 165 lb (74.8 kg), SpO2 97 %.      For further details of [facility] emergency department encounter, please see documentation by advanced practice provider, ADRIANA Suh.       Juan Noyola MD  02/13/21 9015

## 2021-02-04 NOTE — ED PROVIDER NOTES
[address] [facility]        Pt Name: Monster Law  MRN: [id number]  [place] 10/29/1929  Date of evaluation: 2/4/2021  Provider: Divine Gibbs PA-C  PCP: Daryle Hamburg, MD     I have seen and evaluated this patient with my supervising physician Taylor Mtz MD.    [address] [place] [address]       Chief Complaint   Patient presents with    Loss of Consciousness     pt brought in by [facility] from [address] [place] [address] who report sycnope (found slumped over at breakfast) pt complains of feeling week. Pt NIH for EMS = 0       HISTORY OF PRESENT ILLNESS   (Location, Timing/Onset, Context/Setting, Quality, Duration, Modifying Factors, Severity, Associated Signs and Symptoms)  Note limiting factors. Monster Law is a 80 y.o. female that presents to the emergency department after syncopal episode. She had her second Pfizer Covid vaccine yesterday states she is just felt generally weak today. She lives at [facility] and was at breakfast today and was feeling weak like she just needed to sleep and believe she had a syncopal episode. She was sitting in the chair when this happened and denies falling out of the chair injuring herself. She denies any pain by the time she gets here and just states she feels generally weak. She denies headache, visual changes, nausea, vomiting, fevers, chest pain, shortness of breath, cough, abdominal pain, urinary or bowel symptoms. Denies numbness or difficulty moving her extremities. Nursing Notes were all reviewed and agreed with or any disagreements were addressed in the HPI. REVIEW OF SYSTEMS    (2-9 systems for level 4, 10 or more for level 5)     Review of Systems    Positives and Pertinent negatives as per HPI. Except as noted above in the ROS, all other systems were reviewed and negative.        PAST MEDICAL HISTORY     Past Medical History:   Diagnosis Date    Atrial fibrillation ([facility]     Depression 05/04/2011    Dermatitis 07/13/2012    Elevated blood sugar 244.9    Fatigue 02/252/2010    Glaucoma     Hyperglycemia     Hyperlipidemia     Hypertension     Hypothyroidism     Impaired fasting blood sugar 05/04/2011    Impaired fasting glucose     Pulmonary hypertension, primary ([facility] 04/19/2010    Thrombocytopenia ([facility]     Vitamin D deficiency     Vitamin D deficiency 07/06/2010         SURGICAL HISTORY     Past Surgical History:   Procedure Laterality Date    APPENDECTOMY      OTHER SURGICAL HISTORY  07/05/2018    RIGHT hip gamma nail     OVARY SURGERY      TONSILLECTOMY           CURRENTMEDICATIONS       Previous Medications    ACETAMINOPHEN (TYLENOL) 325 MG TABLET    Take 650 mg by mouth every 6 hours as needed for Pain or Fever    AMIODARONE (CORDARONE) 200 MG TABLET    Take 0.5 tablets by mouth daily    ASPIRIN EC 81 MG EC TABLET    Take 1 tablet by mouth daily    ATENOLOL (TENORMIN) 25 MG TABLET    TAKE ONE TABLET BY MOUTH EVERY DAY    BRIMONIDINE (ALPHAGAN P) 0.1 % SOLN    Place 1 drop into both eyes every 8 hours. BROMFENAC (PROLENSA) 0.07 % SOLN    Place 1 drop into both eyes daily    CELEXA 40 MG TABLET    TAKE ONE TABLET BY MOUTH EVERY DAY    CHOLECALCIFEROL (VITAMIN D3) 5000 UNITS TABS    Take 1 tablet by mouth every other day     CITALOPRAM (CELEXA) 40 MG TABLET    TAKE ONE TABLET BY MOUTH EVERY DAY    CLOPIDOGREL (PLAVIX) 75 MG TABLET    Take 1 tablet by mouth daily    DICLOFENAC SODIUM (VOLTAREN) 1 % GEL    Apply 2 g topically 2 times daily as needed for Pain    DORZOLAMIDE-TIMOLOL (COSOPT) 22.3-6.8 MG/ML OPHTHALMIC SOLUTION    Place 1 drop into both eyes 2 times daily. DORZOLAMIDE-TIMOLOL (COSOPT) 22.3-6.8 MG/ML OPHTHALMIC SOLUTION    Place 1 drop into both eyes 2 times daily.       HYDRALAZINE (APRESOLINE) 25 MG TABLET    Take 1 tablet by mouth every 8 hours    HYDROCORTISONE 2.5 % CREAM    Apply topically as needed HYDROXYZINE (ATARAX) 10 MG TABLET    Take 1 tablet by mouth every 8 hours as needed for Itching. LATANOPROST (XALATAN) 0.005 % OPHTHALMIC SOLUTION    Place 1 drop into both eyes nightly. LATANOPROST (XALATAN) 0.005 % OPHTHALMIC SOLUTION    Place 1 drop into both eyes nightly    LEVOTHYROXINE (SYNTHROID) 75 MCG TABLET    Take 75 mcg by mouth daily. LEVOTHYROXINE (SYNTHROID) 75 MCG TABLET    TAKE 1 TABLET BY MOUTH EVERY DAY    LIFT CHAIR MISC    by Does not apply route    METHAZOLAMIDE (NEPTAZANE) 50 MG TABLET    Take 50 mg by mouth 2 times daily    SENNA (SENOKOT) 8.6 MG TABLET    Take 2 tablets by mouth daily     SENNOSIDES-DOCUSATE SODIUM (SENOKOT-S) 8.6-50 MG TABLET    Take 2 tablets by mouth daily    SODIUM CHLORIDE (OCEAN, BABY AYR) 0.65 % NASAL SPRAY    2 sprays by Nasal route as needed for Congestion    TRAZODONE (DESYREL) 50 MG TABLET    Take 50 mg by mouth nightly    TRIAMCINOLONE (KENALOG) 0.1 % CREAM    Apply topically 2 times daily for rash. TRIAMTERENE-HYDROCHLOROTHIAZIDE (MAXZIDE-25) 37.5-25 MG PER TABLET    Take 1 tablet by mouth daily. ALLERGIES     Shrimp flavor, Timolol, Sulfa antibiotics, and Sulfa antibiotics    FAMILYHISTORY       Family History   Problem Relation Age of Onset    Other Mother         dementia    Heart Disease Father           SOCIAL HISTORY       Social History     Tobacco Use    Smoking status: Never Smoker    Smokeless tobacco: Never Used   Substance Use Topics    Alcohol use: No     Comment: rare    Drug use: No       SCREENINGS             PHYSICAL EXAM    (up to 7 for level 4, 8 or more for level 5)     ED Triage Vitals [02/04/21 0916]   BP Temp Temp Source Pulse Resp SpO2 Height Weight   -- 97.2 °F (36.2 °C) Oral 57 14 97 % 5' 4\" (1.626 m) 165 lb (74.8 kg)       Physical Exam  Vitals signs and nursing note reviewed. Constitutional:       Appearance: She is well-developed. She is not diaphoretic. HENT:      Head: Atraumatic.       Nose: Nose normal.   Eyes:      General:         Right eye: No discharge. Left eye: No discharge. Neck:      Musculoskeletal: Normal range of motion. Cardiovascular:      Rate and Rhythm: Normal rate and regular rhythm. Heart sounds: No murmur. No friction rub. No gallop. Pulmonary:      Effort: Pulmonary effort is normal. No respiratory distress. Breath sounds: No stridor. No wheezing, rhonchi or rales. Abdominal:      General: Bowel sounds are normal. There is no distension. Palpations: Abdomen is soft. There is no mass. Tenderness: There is no abdominal tenderness. There is no guarding or rebound. Hernia: No hernia is present. Musculoskeletal: Normal range of motion. General: No swelling. Left lower leg: Edema present. Comments: Patient has some generalized swelling of the left leg she states has been there for some time. Unable to quantify how long it has been there for. Gross full range of motion throughout all 4 extremities. Skin:     General: Skin is warm and dry. Findings: No erythema or rash. Neurological:      Mental Status: She is alert and oriented to person, place, and time. Cranial Nerves: No cranial nerve deficit.    Psychiatric:         Behavior: Behavior normal.         DIAGNOSTIC RESULTS   LABS:    Labs Reviewed   COMPREHENSIVE METABOLIC PANEL - Abnormal; Notable for the following components:       Result Value    Glucose 151 (*)     GFR Non- 42 (*)     GFR  51 (*)     ALT 8 (*)     All other components within normal limits    Narrative:     Performed at:  [facility]-[address], [facility] [address]   Phone [phone]   URINE RT REFLEX TO CULTURE - Abnormal; Notable for the following components:    Clarity, UA CLOUDY (*)     Leukocyte Esterase, Urine SMALL (*)     All other components within normal limits    Narrative:     Performed at:  [facility]) - [facility]  [address] [facility]  [place], [address]   Phone [phone]   MICROSCOPIC URINALYSIS - Abnormal; Notable for the following components:    Bacteria, UA 2+ (*)     All other components within normal limits    Narrative:     Performed at:  [facility]  [address] [facility],  [place], [address]   Phone [phone]   CBC WITH AUTO DIFFERENTIAL    Narrative:     Performed at:  [facility]  [address] [facility], [address]   Phone [phone]   TROPONIN    Narrative:     Performed at:  [facility] [address], [place], [address] [facility]   Phone [phone]       All other labs were within normal range or not returned as of this dictation. EKG: All EKG's are interpreted by the Emergency Department Physician in the absence of a cardiologist.  Please see their note for interpretation of EKG. RADIOLOGY:   Non-plain film images such as CT, Ultrasound and MRI are read by the radiologist. Plain radiographic images are visualized and preliminarily interpreted by the ED Provider with the below findings:        Interpretation per the Radiologist below, if available at the time of this note:    CT CHEST PULMONARY EMBOLISM W CONTRAST   Final Result   1. Acute bronchitis versus reactive airway disease. 2. Interstitial lung disease. 3. 6.6 cm predominantly fat mass within the right hepatic dome in close   association to the diaphragm. The differential diagnosis includes hepatic   adenoma, leiomyolipoma, germ-cell tumor and mesenchymal tumors. VL Extremity Venous Left         XR CHEST PORTABLE   Final Result   No acute process.                  PROCEDURES   Unless otherwise noted below, none     Procedures    CRITICAL CARE TIME   N/A    CONSULTS: Dr. Sue Hobbs and DIFFERENTIAL DIAGNOSIS/MDM:   Vitals:    Vitals:

## 2021-02-05 VITALS
OXYGEN SATURATION: 95 % | TEMPERATURE: 97.4 F | DIASTOLIC BLOOD PRESSURE: 72 MMHG | RESPIRATION RATE: 16 BRPM | BODY MASS INDEX: 30.1 KG/M2 | WEIGHT: 176.3 LBS | HEART RATE: 83 BPM | HEIGHT: 64 IN | SYSTOLIC BLOOD PRESSURE: 202 MMHG

## 2021-02-05 PROBLEM — S06.5XAA SUBDURAL HEMATOMA: Status: RESOLVED | Noted: 2020-04-27 | Resolved: 2021-02-05

## 2021-02-05 PROBLEM — Z45.09 ENCOUNTER FOR ELECTRONIC ANALYSIS OF REVEAL EVENT RECORDER: Status: RESOLVED | Noted: 2018-07-25 | Resolved: 2021-02-05

## 2021-02-05 LAB
ANION GAP SERPL CALCULATED.3IONS-SCNC: 8 MMOL/L (ref 3–16)
BUN BLDV-MCNC: 16 MG/DL (ref 7–20)
CALCIUM SERPL-MCNC: 9.4 MG/DL (ref 8.3–10.6)
CHLORIDE BLD-SCNC: 106 MMOL/L (ref 99–110)
CO2: 24 MMOL/L (ref 21–32)
CREAT SERPL-MCNC: 1.1 MG/DL (ref 0.6–1.2)
EKG ATRIAL RATE: 60 BPM
EKG DIAGNOSIS: NORMAL
EKG P AXIS: 74 DEGREES
EKG P-R INTERVAL: 110 MS
EKG Q-T INTERVAL: 458 MS
EKG QRS DURATION: 100 MS
EKG QTC CALCULATION (BAZETT): 458 MS
EKG R AXIS: -8 DEGREES
EKG T AXIS: 66 DEGREES
EKG VENTRICULAR RATE: 60 BPM
GFR AFRICAN AMERICAN: 56
GFR NON-AFRICAN AMERICAN: 47
GLUCOSE BLD-MCNC: 93 MG/DL (ref 70–99)
POTASSIUM SERPL-SCNC: 4.4 MMOL/L (ref 3.5–5.1)
SARS-COV-2, NAAT: NOT DETECTED
SODIUM BLD-SCNC: 138 MMOL/L (ref 136–145)

## 2021-02-05 PROCEDURE — 97116 GAIT TRAINING THERAPY: CPT

## 2021-02-05 PROCEDURE — G0378 HOSPITAL OBSERVATION PER HR: HCPCS

## 2021-02-05 PROCEDURE — 97161 PT EVAL LOW COMPLEX 20 MIN: CPT

## 2021-02-05 PROCEDURE — 93010 ELECTROCARDIOGRAM REPORT: CPT | Performed by: INTERNAL MEDICINE

## 2021-02-05 PROCEDURE — 92610 EVALUATE SWALLOWING FUNCTION: CPT

## 2021-02-05 PROCEDURE — 97535 SELF CARE MNGMENT TRAINING: CPT

## 2021-02-05 PROCEDURE — 36415 COLL VENOUS BLD VENIPUNCTURE: CPT

## 2021-02-05 PROCEDURE — 6370000000 HC RX 637 (ALT 250 FOR IP): Performed by: INTERNAL MEDICINE

## 2021-02-05 PROCEDURE — 92526 ORAL FUNCTION THERAPY: CPT

## 2021-02-05 PROCEDURE — 97165 OT EVAL LOW COMPLEX 30 MIN: CPT

## 2021-02-05 PROCEDURE — 93880 EXTRACRANIAL BILAT STUDY: CPT

## 2021-02-05 PROCEDURE — U0002 COVID-19 LAB TEST NON-CDC: HCPCS

## 2021-02-05 PROCEDURE — 80048 BASIC METABOLIC PNL TOTAL CA: CPT

## 2021-02-05 RX ORDER — TRAZODONE HYDROCHLORIDE 50 MG/1
25 TABLET ORAL NIGHTLY
Qty: 15 TABLET | Refills: 0 | Status: SHIPPED | OUTPATIENT
Start: 2021-02-05

## 2021-02-05 RX ADMIN — CHOLECALCIFEROL TAB 125 MCG (5000 UNIT) 5000 UNITS: 125 TAB at 09:03

## 2021-02-05 RX ADMIN — HYDRALAZINE HYDROCHLORIDE 25 MG: 25 TABLET, FILM COATED ORAL at 05:57

## 2021-02-05 RX ADMIN — SENNOSIDES 17.2 MG: 8.6 TABLET, FILM COATED ORAL at 09:03

## 2021-02-05 RX ADMIN — HYDRALAZINE HYDROCHLORIDE 25 MG: 25 TABLET, FILM COATED ORAL at 12:53

## 2021-02-05 RX ADMIN — AMIODARONE HYDROCHLORIDE 100 MG: 200 TABLET ORAL at 09:03

## 2021-02-05 RX ADMIN — LEVOTHYROXINE SODIUM 75 MCG: 0.07 TABLET ORAL at 05:57

## 2021-02-05 RX ADMIN — CLOPIDOGREL BISULFATE 75 MG: 75 TABLET ORAL at 12:53

## 2021-02-05 NOTE — CARE COORDINATION
Discharge order noted, CM called and talked to [place] in admissions at the facility. Stated patient has to have a Rapid  covid-19 test done before going back. Pt's RN was updated. CM attempted to Schedule transport back to the facility, earliest time available was between 11.30-12 might [facility] [address] ambulance. [facility] ambulance, [facility] no opening for today. [facility] was asked if family is allowed to transport pt , stated family can transport patient back if able  to,  VM  left on the son's  Phone. Covid -19 test ordered.

## 2021-02-05 NOTE — PROGRESS NOTES
CLINICAL BEDSIDE SWALLOWING EVALUATION  Speech Therapy Department    Patient Name:  Monster Law  :  10/29/1929  Pain level:denies   Medical Diagnosis:   Syncope and collapse [R55]    HPI: Koki Clements is a 80 y.o. female that presents to the emergency department after syncopal episode. She had her second Pfizer Covid vaccine yesterday states she is just felt generally weak today. She lives at [facility] and was at breakfast today and was feeling weak like she just needed to sleep and believe she had a syncopal episode. She was sitting in the chair when this happened and denies falling out of the chair injuring herself. She denies any pain by the time she gets here and just states she feels generally weak. She denies headache, visual changes, nausea, vomiting, fevers, chest pain, shortness of breath, cough, abdominal pain, urinary or bowel symptoms. Denies numbness or difficulty moving her extremities. \"     SLP eval and treat orders received. No diet order in chart. Pt denied and difficulty with swallowing at baseline. Treatment Diagnosis:  Dysphagia    Impressions: Pt was positioned upright in chair on RA. Oral mechanism examination revealed ROM/coordination to be grossly WNL. Various consistency trials were provided to assess swallow function. Oral phase of swallow grossly appears WNL. No oral phase deficits noted during evaluation. Pharyngeal phase of swallow appears grossly WNL. No pharyngeal deficits noted during evaluation. Laryngeal elevation appears WFL based upon palpation of anterior neck during swallow. Vocal quality dry before and after po trials.     Dietary Recommendations:  Regular texture diet with thin liquids     Strategies: 90 degree positioning with all p.o. intake; small bites/sips; alternate textures through meal; reduce rate of intake    Treatment/Goals: No further SLP followup indicated as oral and pharyngeal phases of swallow appear WFL at time of evaluation. Please re-consult as needed. Oral motor Exam:  Dentition: adequate   Labial/Facial: WNL   Lingual: WNL   Voice: WNL     Oral Phase:   Oral phase of swallow grossly appears WNL. No oral phase deficits noted during evaluation. Pharyngeal Phase:  Pharyngeal phase of swallow appears grossly WNL. No pharyngeal deficits noted during evaluation. Laryngeal elevation appears WFL based upon palpation of anterior neck during swallow. Vocal quality dry before and after po trials. Patient/Family Education:Education, results and recommendations given to the Pt and nurse, who verbalized understanding    Timed Code Treatment: 0 minutes     Total Treatment Time: 30 minutes     Discharge Recommendations: Speech Therapy for Speech/Dysphagia treatment at discharge. If patient discharges prior to next session this note will serve as a discharge summary.       [place], [facility]  Speech Language Pathologist

## 2021-02-05 NOTE — DISCHARGE INSTR - COC
Continuity of Care Form    Patient Name: Julia Person   :  10/29/1929  MRN:  [id number]    Admit date:  2021  Discharge date:  2021    Code Status Order: Prior   Advance Directives:   Advance Care Flowsheet Documentation       Date/Time Healthcare Directive Type of Healthcare Directive Copy in [address] Agent's Name Healthcare Agent's Phone Number    21 2203  Yes, patient has an advance directive for healthcare treatment  Durable power of  for health care  Yes, copy in chart  Adult Children  --  --    21 1852  Yes, patient has an advance directive for healthcare treatment  Durable power of  for health care  No, copy requested from family  Adult Children  --  --            Admitting Physician:  Erin Ervin MD  PCP: Tenisha Hernandez MD    Discharging Nurse: [facility] Unit/Room#: 3AN-3313/3313-01  Discharging Unit Phone Number: ***    Emergency Contact:   Extended Emergency Contact Information  Primary Emergency Contact: Wilberto Armstrong  Address: [address] [place] [address], [address] [place] [address],[facility] [address] Phone: [phone]  Mobile Phone: [phone]  Relation: Child  Secondary Emergency Contact: Francois Armstrong   [address] Phone: [phone]  Relation: Child    Past Surgical History:  Past Surgical History:   Procedure Laterality Date    APPENDECTOMY      OTHER SURGICAL HISTORY  2018    RIGHT hip gamma nail     OVARY SURGERY      TONSILLECTOMY         Immunization History:   Immunization History   Administered Date(s) Administered    Influenza Vaccine, unspecified formulation 2014, 2014, 09/10/2015, 09/10/2015    Influenza Virus Vaccine 2013, 11/10/2013, 10/01/2017    PPD Test 2013    Pneumococcal Conjugate 7-valent (Pamalee Riff) 11/10/2013    Pneumococcal Polysaccharide (Tihkysrzz62) 2012, 2012    Zoster Live (Zostavax) 2012, 2012       Active Problems:  Patient Active Problem List   Diagnosis Code    Troponin I above reference range R77.8    Depression F32.9    HTN (hypertension) I10    Hypothyroid E03.9    Atrial fibrillation (HCC) I48.91    Persistent atrial fibrillation I48.19    Paroxysmal atrial fibrillation ([facility] I48.0    Fall from slip, trip, or stumble, initial encounter W01. 0XXA    Syncope and collapse R55    Encounter for electronic analysis of reveal event recorder Z45.09    Depression with anxiety F41.8    Rash and nonspecific skin eruption R21    Hyperlipidemia E78.5    Subdural hematoma ([facility] S06.5X9A    Ataxia R27.0       Isolation/Infection:   Isolation            No Isolation          Patient Infection Status       None to display            Nurse Assessment:  Last Vital Signs: BP (!) 153/84   Pulse 74   Temp 97.6 °F (36.4 °C) (Oral)   Resp 16   Ht 5' 4\" (1.626 m)   Wt 176 lb 4.8 oz (80 kg)   SpO2 96%   BMI 30.26 kg/m²     Last documented pain score (0-10 scale): Pain Level: 0  Last Weight:   Wt Readings from Last 1 Encounters:   02/04/21 176 lb 4.8 oz (80 kg)     Mental Status:  {IP PT MENTAL STATUS:20030:::0}    IV Access:  { REA IV ACCESS:673770524:::0}    Nursing Mobility/ADLs:  Walking   {CHP DME ADLs:711702239:::0}  Transfer  {CHP DME ADLs:453254480:::0}  Bathing  {CHP DME ADLs:806932233:::0}  Dressing  {CHP DME ADLs:034269542:::0}  Toileting  {CHP DME ADLs:989576954:::0}  Feeding  {CHP DME ADLs:539127873:::0}  Med Admin  {CHP DME ADLs:587914437:::0}  Med Delivery   { REA MED Delivery:911067627:::0}    Wound Care Documentation and Therapy:  Incision 07/05/18 Hip Right (Active)   Number of days: 946        Elimination:  Continence: Bowel: {YES / ZW:66878}  Bladder: {YES / DC:37086}  Urinary Catheter: {Urinary Catheter:861506461:::0}   Colostomy/Ileostomy/Ileal Conduit: {YES / DD:84863}       Date of Last BM: ***  No intake or output data in the 24 hours ending 02/05/21 1521  No intake/output data recorded.     Safety Concerns:     [address] REA Safety Concerns:196062669:::0}    Impairments/Disabilities:      [address] [facility] Impairments/Disabilities:870605476:::0}    Nutrition Therapy:  Current Nutrition Therapy:   [address] [facility] Diet List:776927113:::0}    Routes of Feeding: {CHP DME Other Feedings:601857858:::0}  Liquids: {Slp liquid thickness:69693}  Daily Fluid Restriction: {CHP DME Yes amt example:445711633:::0}  Last Modified Barium Swallow with Video (Video Swallowing Test): {Done Not Done UWAS:371186098:::5}    Treatments at the Time of Hospital Discharge:   Respiratory Treatments: ***  Oxygen Therapy:  {Therapy; copd oxygen:08790:::0}  Ventilator:    {[facility] Vent List:007577063:::0}    Rehab Therapies: {THERAPEUTIC INTERVENTION:5746729320}  Weight Bearing Status/Restrictions: [address]  Weight Bearin:::0}  Other Medical Equipment (for information only, NOT a DME order):  {EQUIPMENT:001135697}  Other Treatments: ***    Patient's personal belongings (please select all that are sent with patient):  {CHP DME Belongings:302082863:::0}    RN SIGNATURE:  {Esignature:603761419:::0}    CASE MANAGEMENT/SOCIAL WORK SECTION    Inpatient Status Date: ***    Readmission Risk Assessment Score:  Readmission Risk              Risk of Unplanned Readmission:        0           Discharging to Facility/ Agency   Name:   Address:  Phone:  Fax:    Dialysis Facility (if applicable)   Name:  Address:  Dialysis Schedule:  Phone:  Fax:    / signature: {Esignature:425607495:::0}    PHYSICIAN SECTION    Prognosis: Fair    Condition at Discharge: Stable    Rehab Potential (if transferring to Rehab): Fair    Recommended Labs or Other Treatments After Discharge: out patient ECHO and  follow up with PCP     Physician Certification: I certify the above information and transfer of Akiko Morales  is necessary for the continuing treatment of the diagnosis listed and that she requires Assisted Living for greater 30 days.      Update Admission H&P: No change in H&P    PHYSICIAN SIGNATURE:  Electronically signed by Chepe Juárez MD on 2/5/21 at 3:22 PM EST

## 2021-02-05 NOTE — PROGRESS NOTES
Pt seen in  ED, admission completed. Pt is alert and oriented x 4. Pt lives at [facility] is being admitted for eval d/t syncope and collapse. Plan of care updated, all questions answered.

## 2021-02-05 NOTE — DISCHARGE INSTR - DIET

## 2021-02-05 NOTE — PROGRESS NOTES
Occupational Therapy   Occupational Therapy Initial Assessment/Discharge Summary  Date: 2021   Patient Name: Jose Juan Escamilla  MRN: [id number]     : 10/29/1929    Date of Service: 2021    Discharge Recommendations:Anahi Nascimento scored a 19/24 on the -[facility] ADL Inpatient form. At this time, no further OT is recommended upon discharge due to patient having ADL support at home. Recommend patient returns to prior setting with prior services. OT Equipment Recommendations  Equipment Needed: No    Assessment   Assessment: Patient lives at [address] [place] [address] and has assist for bathing and I ADLs. Patient at baseline for ADLs. No skilled OT indicated at this time. Treatment Diagnosis: syncope and collapse  Prognosis: Good  Decision Making: Low Complexity  OT Education: OT Role;Plan of Care  Patient Education: Patient can benefit from repetition with Learning due to limited vision and hearing. REQUIRES OT FOLLOW UP: No  Activity Tolerance  Activity Tolerance: Patient Tolerated treatment well  Safety Devices  Safety Devices in place: Yes  Type of devices: All fall risk precautions in place;Call light within reach; Chair alarm in place; Patient at risk for falls;Nurse notified;Gait belt;Left in chair           Patient Diagnosis(es): The primary encounter diagnosis was Syncope and collapse. Diagnoses of Acute electrocardiogram changes and Liver mass were also pertinent to this visit. has a past medical history of Ataxia, Atrial fibrillation ([address] Depression, Dermatitis, Elevated blood sugar, Fatigue, Glaucoma, Glaucoma, Hyperglycemia, Hyperlipidemia, Hypertension, Hypothyroidism, Impaired fasting blood sugar, Impaired fasting glucose, Peripheral vascular disease ([address] Pulmonary hypertension, primary ([address] Thrombocytopenia ([address] Vitamin D deficiency, and Vitamin D deficiency. has a past surgical history that includes Appendectomy; Ovary surgery;  Tonsillectomy; and other surgical history (07/05/2018). Treatment Diagnosis: syncope and collapse      Restrictions  Restrictions/Precautions  Restrictions/Precautions: Fall Risk(High)  Required Braces or Orthoses?: No  Position Activity Restriction  Other position/activity restrictions: Grace Uriostegui is a 80 y.o. female that presents to the emergency department after syncopal episode. She had her second Pfizer Covid vaccine yesterday states she is just felt generally weak today. She lives at [facility] and was at breakfast today and was feeling weak like she just needed to sleep and believe she had a syncopal episode. She was sitting in the chair when this happened and denies falling out of the chair injuring herself. She denies any pain by the time she gets here and just states she feels generally weak. She denies headache, visual changes, nausea, vomiting, fevers, chest pain, shortness of breath, cough, abdominal pain, urinary or bowel symptoms. Denies numbness or difficulty moving her extremities. Subjective   General  Chart Reviewed: Yes  Additional Pertinent Hx: Glaucoma, [place]  Family / Caregiver Present: Yes(RN)  Diagnosis: syncope and collapse  Subjective  Subjective: Up in room at sink when OT arrived. Denies pain.   Patient Currently in Pain: Denies  Vital Signs  Patient Currently in Pain: Denies  Social/Functional History  Social/Functional History  Lives With: Alone  Type of Home: Assisted living([facility] [address] [facility])  Home Layout: One level  Home Access: Elevator  Bathroom Shower/Tub: Tub/Shower unit(walk-in tub)  Bathroom Toilet: Handicap height  Bathroom Equipment: Shower chair, Grab bars in shower, Grab bars around toilet  Bathroom Accessibility: Ced Kruger: 4 wheeled walker, [address] [place] [address] Help From: Home health  ADL Assistance: Needs assistance  Bath: (Pt reports getting assistance bathing, pt completes what she can reach, staff assists with what she cannot reach)  Dressing: Independent  Homemaking Responsibilities: No(provides meals)  Ambulation Assistance: Independent(with walker)  Transfer Assistance: Independent  Active : No  Patient's  Info: pt uses transport services for MD appts/MD comes to facility  Occupation: 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 Had therapy services for a time, then returned to [place]. Objective   Vision: Impaired  Vision Exceptions: (glaucoma)  Hearing: Exceptions to [facility]  Hearing Exceptions: Hard of hearing/hearing concerns; No hearing aid    Orientation  Overall Orientation Status: Within Functional Limits  Observation/Palpation  Posture: Fair  Balance  Sitting Balance: Modified independent   Standing Balance: Stand by assistance(RW)  Functional Mobility  Functional - Mobility Device: Rolling Walker  Activity: To/from bathroom; Other  Assist Level: Stand by assistance  ADL  UE Bathing: Supervision;Setup  Toileting: Stand by assistance  Tone RUE  RUE Tone: Normotonic  Tone LUE  LUE Tone: Normotonic  Coordination  Movements Are Fluid And Coordinated: Yes        Transfers  Stand Step Transfers: Stand by assistance  Sit to stand: Stand by assistance  Stand to sit: Stand by assistance     Cognition  Overall Cognitive Status: WFL        Sensation  Overall Sensation Status: WFL        LUE AROM (degrees)  LUE AROM : WFL  RUE AROM (degrees)  RUE AROM : WFL                      Plan   Plan  Times per week: eval and discharge      AM-PAC Score        AM-PAC Inpatient Daily Activity Raw Score: 19 (02/05/21 1500)  AM-PAC Inpatient ADL T-Scale Score : 40.22 (02/05/21 1500)  ADL Inpatient CMS 0-100% Score: 42.8 (02/05/21 1500)  ADL Inpatient CMS G-Code Modifier : CK (02/05/21 1500)    Goals  Short term goals  Time Frame for Short term goals: No goals set       Therapy Time   Individual Concurrent Group Co-treatment   Time In 1244         Time Out 1310         Minutes 26              Timed Code Treatment Minutes:  11 Minutes    Total Treatment Minutes:  Jose Mackenzie 50, [address] [facility]  Melania Moore 103

## 2021-02-05 NOTE — CARE COORDINATION
Patient from [facility]  [place] , PT&OT eval pending, SLP completed. If SNF recommended the facility will accept her to the skilled unit as per [facility] in  admissions. For Sheilakevpaty Alonso services fax the orders and AVS to the facility- [phone].

## 2021-02-05 NOTE — PROGRESS NOTES
Physical Therapy    Facility/Department: [facility] 3A NURSING  Initial Assessment/Discharge Summary    NAME: Lexie Castillo  : 10/29/1929  MRN: [id number]    Date of Service: 2021    Discharge Recommendations:Anahi Andrews Overall scored a 22/24 on the AM-PAC short mobility form. At this time, no further PT is recommended upon discharge due to functioning near baseline of modified independence, AL and support provided at home. Recommend patient returns to prior setting with prior services. PT Equipment Recommendations  Equipment Needed: No    Assessment   Assessment: Pt presents at or near baseline function and is safe to discharge to home environment once deemed medically appropriate. Prognosis: Good  Decision Making: Low Complexity  PT Education: Goals;PT Role;Plan of Care  No Skilled PT: At baseline function  REQUIRES PT FOLLOW UP: No  Activity Tolerance  Activity Tolerance: Patient Tolerated treatment well       Patient Diagnosis(es): The primary encounter diagnosis was Syncope and collapse. Diagnoses of Acute electrocardiogram changes and Liver mass were also pertinent to this visit. has a past medical history of Ataxia, Atrial fibrillation ([address] Depression, Dermatitis, Elevated blood sugar, Fatigue, Glaucoma, Glaucoma, Hyperglycemia, Hyperlipidemia, Hypertension, Hypothyroidism, Impaired fasting blood sugar, Impaired fasting glucose, Peripheral vascular disease ([address] Pulmonary hypertension, primary ([address] Thrombocytopenia ([address] Vitamin D deficiency, and Vitamin D deficiency. has a past surgical history that includes Appendectomy; Ovary surgery; Tonsillectomy; and other surgical history (2018). Restrictions  Restrictions/Precautions  Restrictions/Precautions: Fall Risk(High)  Required Braces or Orthoses?: No  Position Activity Restriction  Other position/activity restrictions: Lexie Castillo is a 80 y.o. female that presents to the emergency department after syncopal episode.   She cannot reach)  Dressing: Independent  Homemaking Responsibilities: No(provides meals)  Ambulation Assistance: Independent(with walker)  Transfer Assistance: Independent  Active : No  Patient's  Info: pt uses transport services for MD appts/MD comes to facility  Occupation: Retired  Type of occupation: Kept books in [address] [address]: Pt participates in activities at [employer]  Additional Comments: Pt reports falling a few times >6 months ago, reports that's why she uses walker. Had therapy services for a time, then returned to AL. Cognition        Objective     Observation/Palpation  Posture: Fair    AROM RLE (degrees)  RLE AROM: WFL  AROM LLE (degrees)  LLE AROM : WFL  Strength RLE  Strength RLE: WFL  Strength LLE  Strength LLE: WFL  Tone RLE  RLE Tone: Normotonic  Tone LLE  LLE Tone: Normotonic  Motor Control  Gross Motor?: WFL  Sensation  Overall Sensation Status: WFL  Bed mobility  Supine to Sit: Unable to assess  Comment: Pt up in room upon arrival and left in chair following ambulation  Transfers  Sit to Stand: Supervision  Stand to sit: Supervision  Ambulation  Ambulation?: Yes  Ambulation 1  Surface: level tile  Device: Rolling Walker  Assistance: Supervision  Quality of Gait: Pt ambulates with decreased step length, slowed loida, narrow ABHIJIT, no LOB. Distance: ~150 ft  Stairs/Curb  Stairs?: No     Balance  Posture: Fair  Sitting - Static: Good  Sitting - Dynamic: Good  Standing - Static: Good;-  Standing - Dynamic: Good;-        Plan   Plan  Times per week: D/C  Safety Devices  Type of devices:  All fall risk precautions in place, Call light within reach, Gait belt, Patient at risk for falls, Left in chair, Nurse notified  Restraints  Initially in place: No    G-Code       OutComes Score                                                  AM-PAC Score  AM-PAC Inpatient Mobility Raw Score : 22 (02/05/21 1348)  AM-PAC Inpatient T-Scale Score : 53.28 (02/05/21 1348)  Mobility Inpatient CMS 0-100% Score: 20.91 (02/05/21 1348)  Mobility Inpatient CMS G-Code Modifier : CJ (02/05/21 1348)          Goals  Short term goals  Time Frame for Short term goals: No acute PT goals       Therapy Time   Individual Concurrent Group Co-treatment   Time In 1244         Time Out 1310         Minutes 26         Timed Code Treatment Minutes: [address], PT   Terrea Prader, PT, DPT, [id number]

## 2021-02-05 NOTE — CARE COORDINATION
Discharge Planning Assessment  RN discharge planner met with patient to discuss reason for admission, current living situation, and potential needs at the time of discharge    Demographics/Insurance verified Yes  - [address]    Current type of dwelling:  AL at [address]    Patient from ECF/SW confirmed with: Aj in admissions    Living arrangements:  Assisted living    Level of function/Support: Minimum assistance from the facility staff. Ambulates with walker, goes to the dinning room for meals. PCP:  Dr Tiffanie Mukherjee    Last Visit to PCP:    DME:  Елена Neal, [employer] alert    Active with any community resources/agencies/skilled home care:  None    Medication compliance issues:  Provided by staff    Financial issues that could impact healthcare:  No        Tentative discharge plan: back to [place] with c services  Vs SNF    Discussed and provided facilities of choice if transition to a skilled nursing facility is required at the time of discharge    Per Aj - if therapy recommends SNF , have a bed can take her. If recommend [place] [address] services order to be faxed to facility. -[phone]      Discussed with patient and/or family that on the day of discharge home tentative time of discharge will be between 10 AM and noon.     Transportation at the time of discharge:  TBD on d/c

## 2021-02-06 NOTE — H&P
[facility] [address]                     [address] [facility], [address]                              HISTORY AND PHYSICAL    PATIENT NAME: Carmelita Strong                    :        10/29/1929  MED REC NO:   [id number]                          ROOM:       9654  ACCOUNT NO:   [de-identified]                           ADMIT DATE: 2021  PROVIDER:     Claudeen Oddi, MD    HISTORY OF PRESENT ILLNESS:  The patient is a 49-year-old white lady  from assisted living at [place] [address]. [facility], came to the  emergency room who reported syncopal episode. The patient was found  slumped over the breakfast.  The patient complained of feeling  generalized weakness. There was no convulsions. No incontinence. No  speech disturbance. No angina pectoris. No shortness of breath. The  patient usually gets around with a walker. There is no change in her  baseline functional status. She did not have any headache, visual  disturbance, diplopia, nausea, vomiting, convulsions or incontinence. PAST MEDICAL HISTORY:  Pertinent for atrial fibrillation, depression and  allergic dermatitis, hyperglycemia, glaucoma, hyperlipidemia,  hypertension, hypothyroidism, impaired glucose tolerance, pulmonary  hypertension, thrombocytopenia and vitamin D deficiency. PAST SURGICAL HISTORY:  Pertinent for appendectomy, right-sided hip  surgery and tonsillectomy. CURRENT MEDICATIONS:  Include Tylenol, amiodarone, aspirin, atenolol,  brimonidine,  Celexa, vitamin D3, clopidogrel, diclofenac gel, timolol  ophthalmic solution, dorzolamide solution ophthalmic, hydralazine,  Xalatan eyedrops, levothyroxine, senna, docusate, sodium chloride,  trazodone, triamcinolone cream and triamterene/hydrochlorothiazide. ALLERGIES:  She is allergic to SHRIMP FLAVOR, TIMOLOL, SULFONAMIDE. FAMILY HISTORY:  Both her parents are  because of natural  causes. Her mother had dementia.   Father had atherosclerotic heart  disease. SOCIAL HISTORY:  She is a . She has 5 grown children. She quit  smoking more than 30 years ago. She quit drinking about the same time. She used to work as a homemaker, never had any occupational history. No  history of substance abuse. REVIEW OF SYSTEMS:  Negative for any dizziness at this time. No  convulsions. No visual disturbance. No dysphagia. No speech  disturbance. No angina pectoris. Denies any shortness of breath. She  does not perform any exertional activities. No abdominal pain. No  hematemesis. No melena. Does have chronic musculoskeletal pain. No  recent TIA. No genitourinary complaint. No incontinence or hematuria. No intermittent claudication. PHYSICAL EXAMINATION:  GENERAL:  Alert, awake, oriented x2, moderately distressed 24-year-old  slightly disoriented to time, but appears to be back to her baseline. VITAL SIGNS:  Her temperature is 97.6, blood pressure 153/84,  respirations 16, heart rate is 74. O2 sat is 96% on room air. HEENT:  Oral mucosa dry. SKIN:  Warm and dry. NECK:  Supple. No jugular venous distention. Faint carotid bruit. LUNGS:  Vesicular breath sounds. Fairly clear to auscultation. HEART:  Regular rate and rhythm. S1, S2, 1/6 systolic ejection murmur. No gallop rhythm. ABDOMEN:  Soft, nontender. Bowel sounds present. EXTREMITIES:  Shows trace edema, distal pulsations are weak. NEUROLOGICAL:  There are no acute focal deficit. The patient appears to  be moving all the four extremities against gravity. There is no  sensorimotor focal deficit. LABORATORY DATA:  Lab evaluation shows sodium 138, potassium 4.4,  chloride 106, CO2 24, BUN 16, creatinine 1.1, anion gap is 8. Blood  sugar is 93, calcium is 9.4. White blood cell count is 5.7,  hemoglobin/hematocrit is 12.9 and 40.6, platelet count is 777. Urinalysis is showing 2+ bacteria but no WBCs.     CT scan of the chest shows no evidence of acute pulmonary embolism. The  patient shows _____ bronchitis versus reactive airway disease with  interstitial lung disease. There is a 6.6 cm predominantly fat mass  within the right hepatic dome in close association with the diaphragm,  which may include a like a hepatic _____ or germ cell tumor or  mesenchymal tumor. Presently there is no compelling need to workup this  any further in a 80-year-old lady, who would not be a surgical candidate  for anything anyway. Chest x-ray shows no acute process. The patient  had a transthoracic echocardiogram 2 years ago that shows LVEF of 60%. No regional wall motion abnormality. Mild aortic regurgitation, mild  tricuspid regurgitation. Estimated right ventricular systolic pressure  at 46 mmHg. There is a normal diastolic function. ASSESSMENT:  1. Syncope. 2.  Hypertension. 3.  Atrial fibrillation chronic, paroxysmal atrial fibrillation,  hypothyroidism, hyperlipidemia, hypertension, depression with anxiety,  ataxia. PLAN:  Get her admitted. The patient was put under observation. The  patient presently had a carotid Doppler that was negative. We can order  an outpatient echocardiogram although there is no compelling need to do  it urgently. The patient can be discharged home. The patient does not  have any of skilled nursing care. She can go back to assisted living. The patient was discharged in stable condition.         Doreen Morfin MD    D: 02/05/2021 15:30:43       T: 02/05/2021 15:40:03     [email or web address]  Job#: [id number]     Doc#: [id number]    CC:

## 2021-02-08 ENCOUNTER — TELEPHONE (OUTPATIENT)
Dept: FAMILY MEDICINE CLINIC | Age: 86
End: 2021-02-08

## 2021-02-08 NOTE — TELEPHONE ENCOUNTER
[facility] Transitions Initial Follow Up Call    Outreach made within 2 business days of discharge: Yes    Patient: Martin Ferguson Patient : 10/29/1929   MRN: [id number]  Reason for Admission: There are no discharge diagnoses documented for the most recent discharge. Discharge Date: 21       Spoke with: Nurse at     Discharge department/facility: [facility]    Scheduled appointment with PCP within 7-14 days    Follow Up  No future appointments.     Elizabeth Otoole

## 2021-09-22 NOTE — LETTER
[address] [facility] [phone] [address] [facility],[address] [phone]    Pacemaker/Defibrillator Clinic          10/26/20        [address] [place] [id number]        Dear Will Card    This letter is to inform you that we received the transmission from your monitor at home that checks your implanted heart device. The next date your monitor will automatically transmit will be 1-26-21. If your report needs attention we will notify you. Your device and monitor are wireless and most transmit cellularly, but please periodically check your monitor is still plugged in to the electrical outlet. If you still use the telephone land line to send please ensure the connection to the phone penelope is secure. This will help to ensure successful automatic transmissions in the future. Also, the monitor needs to be close to you while sleeping at night. Please be aware that the remote device transmission sites are periodically monitored only during regular business hours during which simultaneous in-office device clinics are being run. If your transmission requires attention, we will contact you as soon as possible. Thank you.             [facility] regular

## 2022-06-19 ENCOUNTER — HOSPITAL ENCOUNTER (INPATIENT)
Age: 87
LOS: 1 days | Discharge: NURSING, REHAB OR LONG-TERM CARE | DRG: 310 | End: 2022-06-20
Attending: EMERGENCY MEDICINE | Admitting: INTERNAL MEDICINE
Payer: MEDICARE

## 2022-06-19 ENCOUNTER — APPOINTMENT (OUTPATIENT)
Dept: GENERAL RADIOLOGY | Age: 87
DRG: 310 | End: 2022-06-19
Payer: MEDICARE

## 2022-06-19 DIAGNOSIS — R55 SYNCOPE AND COLLAPSE: Primary | ICD-10-CM

## 2022-06-19 DIAGNOSIS — I48.91 ATRIAL FIBRILLATION WITH RVR (HCC): ICD-10-CM

## 2022-06-19 LAB
ANION GAP SERPL CALCULATED.3IONS-SCNC: 11 MMOL/L (ref 3–16)
BACTERIA: NORMAL /HPF
BASOPHILS ABSOLUTE: 0.1 K/UL (ref 0–0.2)
BASOPHILS RELATIVE PERCENT: 0.8 %
BILIRUBIN URINE: NEGATIVE
BLOOD, URINE: NEGATIVE
BUN BLDV-MCNC: 17 MG/DL (ref 7–20)
CALCIUM SERPL-MCNC: 10.2 MG/DL (ref 8.3–10.6)
CHLORIDE BLD-SCNC: 96 MMOL/L (ref 99–110)
CLARITY: CLEAR
CO2: 24 MMOL/L (ref 21–32)
COLOR: YELLOW
CREAT SERPL-MCNC: 1.1 MG/DL (ref 0.6–1.2)
EKG ATRIAL RATE: 122 BPM
EKG DIAGNOSIS: NORMAL
EKG Q-T INTERVAL: 350 MS
EKG QRS DURATION: 96 MS
EKG QTC CALCULATION (BAZETT): 494 MS
EKG R AXIS: -21 DEGREES
EKG T AXIS: 133 DEGREES
EKG VENTRICULAR RATE: 120 BPM
EOSINOPHILS ABSOLUTE: 0.2 K/UL (ref 0–0.6)
EOSINOPHILS RELATIVE PERCENT: 2.4 %
EPITHELIAL CELLS, UA: 5 /HPF (ref 0–5)
GFR AFRICAN AMERICAN: 56
GFR NON-AFRICAN AMERICAN: 46
GLUCOSE BLD-MCNC: 112 MG/DL (ref 70–99)
GLUCOSE URINE: NEGATIVE MG/DL
HCT VFR BLD CALC: 45.2 % (ref 36–48)
HEMOGLOBIN: 14.6 G/DL (ref 12–16)
HYALINE CASTS: 1 /LPF (ref 0–8)
KETONES, URINE: NEGATIVE MG/DL
LACTIC ACID: 1.2 MMOL/L (ref 0.4–2)
LEUKOCYTE ESTERASE, URINE: ABNORMAL
LYMPHOCYTES ABSOLUTE: 2 K/UL (ref 1–5.1)
LYMPHOCYTES RELATIVE PERCENT: 30 %
MCH RBC QN AUTO: 31.2 PG (ref 26–34)
MCHC RBC AUTO-ENTMCNC: 32.4 G/DL (ref 31–36)
MCV RBC AUTO: 96.3 FL (ref 80–100)
MICROSCOPIC EXAMINATION: YES
MONOCYTES ABSOLUTE: 0.5 K/UL (ref 0–1.3)
MONOCYTES RELATIVE PERCENT: 7 %
NEUTROPHILS ABSOLUTE: 3.9 K/UL (ref 1.7–7.7)
NEUTROPHILS RELATIVE PERCENT: 59.8 %
NITRITE, URINE: NEGATIVE
PDW BLD-RTO: 13.1 % (ref 12.4–15.4)
PH UA: 7.5 (ref 5–8)
PLATELET # BLD: 174 K/UL (ref 135–450)
PMV BLD AUTO: 9 FL (ref 5–10.5)
POTASSIUM SERPL-SCNC: 3.9 MMOL/L (ref 3.5–5.1)
PRO-BNP: 1006 PG/ML (ref 0–449)
PROTEIN UA: ABNORMAL MG/DL
RBC # BLD: 4.7 M/UL (ref 4–5.2)
RBC UA: 2 /HPF (ref 0–4)
SODIUM BLD-SCNC: 131 MMOL/L (ref 136–145)
SPECIFIC GRAVITY UA: 1.01 (ref 1–1.03)
TROPONIN: <0.01 NG/ML
URINE REFLEX TO CULTURE: ABNORMAL
URINE TYPE: ABNORMAL
UROBILINOGEN, URINE: 0.2 E.U./DL
WBC # BLD: 6.6 K/UL (ref 4–11)
WBC UA: 2 /HPF (ref 0–5)

## 2022-06-19 PROCEDURE — 93005 ELECTROCARDIOGRAM TRACING: CPT | Performed by: EMERGENCY MEDICINE

## 2022-06-19 PROCEDURE — 83880 ASSAY OF NATRIURETIC PEPTIDE: CPT

## 2022-06-19 PROCEDURE — 99285 EMERGENCY DEPT VISIT HI MDM: CPT

## 2022-06-19 PROCEDURE — 83605 ASSAY OF LACTIC ACID: CPT

## 2022-06-19 PROCEDURE — 96365 THER/PROPH/DIAG IV INF INIT: CPT

## 2022-06-19 PROCEDURE — 1200000000 HC SEMI PRIVATE

## 2022-06-19 PROCEDURE — 36415 COLL VENOUS BLD VENIPUNCTURE: CPT

## 2022-06-19 PROCEDURE — 81001 URINALYSIS AUTO W/SCOPE: CPT

## 2022-06-19 PROCEDURE — 2500000003 HC RX 250 WO HCPCS: Performed by: EMERGENCY MEDICINE

## 2022-06-19 PROCEDURE — 85025 COMPLETE CBC W/AUTO DIFF WBC: CPT

## 2022-06-19 PROCEDURE — 96361 HYDRATE IV INFUSION ADD-ON: CPT

## 2022-06-19 PROCEDURE — 80048 BASIC METABOLIC PNL TOTAL CA: CPT

## 2022-06-19 PROCEDURE — 93010 ELECTROCARDIOGRAM REPORT: CPT | Performed by: INTERNAL MEDICINE

## 2022-06-19 PROCEDURE — 71045 X-RAY EXAM CHEST 1 VIEW: CPT

## 2022-06-19 PROCEDURE — 84484 ASSAY OF TROPONIN QUANT: CPT

## 2022-06-19 PROCEDURE — 2580000003 HC RX 258: Performed by: EMERGENCY MEDICINE

## 2022-06-19 PROCEDURE — 6370000000 HC RX 637 (ALT 250 FOR IP): Performed by: INTERNAL MEDICINE

## 2022-06-19 PROCEDURE — 96375 TX/PRO/DX INJ NEW DRUG ADDON: CPT

## 2022-06-19 PROCEDURE — 96366 THER/PROPH/DIAG IV INF ADDON: CPT

## 2022-06-19 PROCEDURE — 6360000002 HC RX W HCPCS: Performed by: INTERNAL MEDICINE

## 2022-06-19 RX ORDER — SENNA PLUS 8.6 MG/1
2 TABLET ORAL 2 TIMES DAILY
Status: DISCONTINUED | OUTPATIENT
Start: 2022-06-19 | End: 2022-06-20 | Stop reason: HOSPADM

## 2022-06-19 RX ORDER — CLOPIDOGREL BISULFATE 75 MG/1
75 TABLET ORAL DAILY
Status: DISCONTINUED | OUTPATIENT
Start: 2022-06-20 | End: 2022-06-20 | Stop reason: HOSPADM

## 2022-06-19 RX ORDER — AMIODARONE HYDROCHLORIDE 200 MG/1
100 TABLET ORAL DAILY
Status: DISCONTINUED | OUTPATIENT
Start: 2022-06-20 | End: 2022-06-20 | Stop reason: HOSPADM

## 2022-06-19 RX ORDER — DILTIAZEM HYDROCHLORIDE 5 MG/ML
10 INJECTION INTRAVENOUS ONCE
Status: COMPLETED | OUTPATIENT
Start: 2022-06-19 | End: 2022-06-19

## 2022-06-19 RX ORDER — LANOLIN ALCOHOL/MO/W.PET/CERES
3 CREAM (GRAM) TOPICAL NIGHTLY
Status: DISCONTINUED | OUTPATIENT
Start: 2022-06-19 | End: 2022-06-19

## 2022-06-19 RX ORDER — TRAZODONE HYDROCHLORIDE 50 MG/1
25 TABLET ORAL NIGHTLY
Status: DISCONTINUED | OUTPATIENT
Start: 2022-06-19 | End: 2022-06-19

## 2022-06-19 RX ORDER — LANOLIN ALCOHOL/MO/W.PET/CERES
9 CREAM (GRAM) TOPICAL NIGHTLY
Status: DISCONTINUED | OUTPATIENT
Start: 2022-06-19 | End: 2022-06-20 | Stop reason: HOSPADM

## 2022-06-19 RX ORDER — ACETAMINOPHEN 500 MG
1000 TABLET ORAL 2 TIMES DAILY
Status: DISCONTINUED | OUTPATIENT
Start: 2022-06-19 | End: 2022-06-20 | Stop reason: HOSPADM

## 2022-06-19 RX ORDER — HYDROCHLOROTHIAZIDE 25 MG/1
12.5 TABLET ORAL DAILY
Status: DISCONTINUED | OUTPATIENT
Start: 2022-06-20 | End: 2022-06-20 | Stop reason: HOSPADM

## 2022-06-19 RX ORDER — DILTIAZEM HCL/D5W 125 MG/125
2.5-15 PLASTIC BAG, INJECTION (ML) INTRAVENOUS CONTINUOUS
Status: DISCONTINUED | OUTPATIENT
Start: 2022-06-19 | End: 2022-06-20 | Stop reason: ALTCHOICE

## 2022-06-19 RX ORDER — TRAZODONE HYDROCHLORIDE 50 MG/1
50 TABLET ORAL NIGHTLY
Status: DISCONTINUED | OUTPATIENT
Start: 2022-06-19 | End: 2022-06-20 | Stop reason: HOSPADM

## 2022-06-19 RX ORDER — PANTOPRAZOLE SODIUM 40 MG/1
40 TABLET, DELAYED RELEASE ORAL
Status: DISCONTINUED | OUTPATIENT
Start: 2022-06-20 | End: 2022-06-20 | Stop reason: HOSPADM

## 2022-06-19 RX ORDER — POTASSIUM CHLORIDE 750 MG/1
10 TABLET, FILM COATED, EXTENDED RELEASE ORAL DAILY
Status: DISCONTINUED | OUTPATIENT
Start: 2022-06-20 | End: 2022-06-20 | Stop reason: HOSPADM

## 2022-06-19 RX ORDER — LEVOTHYROXINE SODIUM 0.07 MG/1
75 TABLET ORAL
Status: DISCONTINUED | OUTPATIENT
Start: 2022-06-20 | End: 2022-06-20 | Stop reason: HOSPADM

## 2022-06-19 RX ORDER — ACETAMINOPHEN 325 MG/1
650 TABLET ORAL EVERY 6 HOURS PRN
Status: DISCONTINUED | OUTPATIENT
Start: 2022-06-19 | End: 2022-06-20 | Stop reason: HOSPADM

## 2022-06-19 RX ORDER — ENOXAPARIN SODIUM 100 MG/ML
30 INJECTION SUBCUTANEOUS NIGHTLY
Status: DISCONTINUED | OUTPATIENT
Start: 2022-06-19 | End: 2022-06-20 | Stop reason: HOSPADM

## 2022-06-19 RX ORDER — HYDRALAZINE HYDROCHLORIDE 25 MG/1
25 TABLET, FILM COATED ORAL EVERY 8 HOURS SCHEDULED
Status: DISCONTINUED | OUTPATIENT
Start: 2022-06-19 | End: 2022-06-19

## 2022-06-19 RX ORDER — HYDROCHLOROTHIAZIDE 12.5 MG/1
12.5 CAPSULE, GELATIN COATED ORAL DAILY
COMMUNITY

## 2022-06-19 RX ORDER — LANOLIN ALCOHOL/MO/W.PET/CERES
9 CREAM (GRAM) TOPICAL NIGHTLY
Status: DISCONTINUED | OUTPATIENT
Start: 2022-06-20 | End: 2022-06-19

## 2022-06-19 RX ORDER — 0.9 % SODIUM CHLORIDE 0.9 %
500 INTRAVENOUS SOLUTION INTRAVENOUS ONCE
Status: COMPLETED | OUTPATIENT
Start: 2022-06-19 | End: 2022-06-19

## 2022-06-19 RX ADMIN — SODIUM CHLORIDE 500 ML: 9 INJECTION, SOLUTION INTRAVENOUS at 11:40

## 2022-06-19 RX ADMIN — Medication 9 MG: at 22:17

## 2022-06-19 RX ADMIN — TRAZODONE HYDROCHLORIDE 50 MG: 50 TABLET ORAL at 22:17

## 2022-06-19 RX ADMIN — DILTIAZEM HYDROCHLORIDE 10 MG: 5 INJECTION, SOLUTION INTRAVENOUS at 12:52

## 2022-06-19 RX ADMIN — Medication 2.5 MG/HR: at 11:44

## 2022-06-19 RX ADMIN — ENOXAPARIN SODIUM 30 MG: 100 INJECTION SUBCUTANEOUS at 22:18

## 2022-06-19 RX ADMIN — ACETAMINOPHEN 1000 MG: 500 TABLET ORAL at 22:17

## 2022-06-19 ASSESSMENT — PAIN DESCRIPTION - DESCRIPTORS: DESCRIPTORS: ACHING

## 2022-06-19 ASSESSMENT — PAIN SCALES - GENERAL: PAINLEVEL_OUTOF10: 3

## 2022-06-19 ASSESSMENT — PAIN DESCRIPTION - ORIENTATION: ORIENTATION: LOWER

## 2022-06-19 ASSESSMENT — PAIN DESCRIPTION - LOCATION: LOCATION: BACK

## 2022-06-19 NOTE — ED NOTES
Pt. Assisted to [facility] and back to bed without incident. Urine sample obtained and sent to lab, pt. Complaining that she is here. Pt. Son Aayush Payton at bedside.       Collin Lin RN  06/19/22 9080

## 2022-06-19 NOTE — ED PROVIDER NOTES
EKG is reviewed by myself. Dated today [phone]. Rate 59 sinus bradycardia some diffuse ST wave depressions. No Significant change from prior although prior is read as A. fib. Atrial P wave morphology is low voltage.      Yari Lamas MD  06/19/22 1387

## 2022-06-19 NOTE — ED PROVIDER NOTES
[address] [facility]  EMERGENCY DEPARTMENTENCOUNTER      Pt Name: Nhi Rivera  MRN: [id number]  Kipgfkay 10/29/1929  Date ofevaluation: 6/19/2022  Provider: Mahi Salinas MD    CHIEF COMPLAINT       Chief Complaint   Patient presents with    Loss of Consciousness     Patient arrived to ER by squad from [facility] with syncopal episode. No other information obtained from squad. ECF did not call. Hx dementia        HPI    HISTORY OF PRESENT ILLNESS   (Location/Symptom, Timing/Onset,Context/Setting, Quality, Duration, Modifying Factors, Severity)  Note limiting factors. Nhi Rivera is a 80 y.o. female who presents to the emergency department after syncopal episode. This is a 44-year-old female who is hard of hearing who presents from an extended care facility after apparent syncopal episode. The patient also has a history of some dementia. Is unclear when this happened. NursingNotes were reviewed. Review of Systems    REVIEW OF SYSTEMS    (2-9 systems for level 4, 10 or more for level 5)     Review of Systems   Constitutional: Negative for fever. HENT: Negative for rhinorrhea and sore throat. Eyes: Negative for redness. Respiratory: Negative for shortness of breath. Cardiovascular: Negative for chest pain. Gastrointestinal: Negative for abdominal pain. Genitourinary: Negative for flank pain. Neurological: Negative for headaches. Hematological: Negative for adenopathy. Psychiatric/Behavioral: Negative for confusion. Except as noted above the remainder of the review of systems was reviewed and negative.        PAST MEDICAL HISTORY     Past Medical History:   Diagnosis Date    Ataxia     Atrial fibrillation ([facility]     Depression 05/04/2011    Dermatitis 07/13/2012    Elevated blood sugar 244.9    Fatigue 02/252/2010    Glaucoma     Glaucoma     Hyperglycemia     Hyperlipidemia     Hypertension     Hypothyroidism     Impaired fasting blood sugar 05/04/2011    Impaired fasting glucose     Peripheral vascular disease (HCC)     [facility] 04/19/2010    Thrombocytopenia ([facility]     Vitamin D deficiency     Vitamin D deficiency 07/06/2010         SURGICALHISTORY       Past Surgical History:   Procedure Laterality Date    APPENDECTOMY      OTHER SURGICAL HISTORY  07/05/2018    RIGHT hip gamma nail     OVARY SURGERY      TONSILLECTOMY           CURRENT MEDICATIONS       Previous Medications    ACETAMINOPHEN (TYLENOL) 325 MG TABLET    Take 650 mg by mouth every 6 hours as needed for Pain or Fever    AMIODARONE (CORDARONE) 200 MG TABLET    Take 0.5 tablets by mouth daily    CHOLECALCIFEROL (VITAMIN D3) 5000 UNITS TABS    Take 1 tablet by mouth every other day     CLOPIDOGREL (PLAVIX) 75 MG TABLET    Take 1 tablet by mouth daily    DICLOFENAC SODIUM (VOLTAREN) 1 % GEL    Apply 2 g topically 2 times daily as needed for Pain    HYDRALAZINE (APRESOLINE) 25 MG TABLET    Take 1 tablet by mouth every 8 hours    LEVOTHYROXINE (SYNTHROID) 75 MCG TABLET    TAKE 1 TABLET BY MOUTH EVERY DAY    LIFT CHAIR MISC    by Does not apply route    MELATONIN 3 MG TABS TABLET    Take 3 mg by mouth daily    OMEPRAZOLE (PRILOSEC) 20 MG DELAYED RELEASE CAPSULE    Take 20 mg by mouth daily    POTASSIUM CHLORIDE (MICRO-K) 10 MEQ EXTENDED RELEASE CAPSULE    Take 10 mEq by mouth daily    SENNA (SENOKOT) 8.6 MG TABLET    Take 2 tablets by mouth 2 times daily     SODIUM CHLORIDE (OCEAN, BABY AYR) 0.65 % NASAL SPRAY    1 spray by Nasal route as needed for Congestion    TRAZODONE (DESYREL) 50 MG TABLET    Take 0.5 tablets by mouth nightly Take 1/2 tab at bedtime       ALLERGIES     Shellfish-derived products, Shrimp flavor, Timolol, Tramadol, Sulfa antibiotics, and Sulfa antibiotics    FAMILY HISTORY       Family History   Problem Relation Age of Onset    Other Mother         dementia    Heart Disease Father SOCIAL HISTORY       Social History     Socioeconomic History    Marital status:      Spouse name: None    Number of children: 4    Years of education: None    Highest education level: None   Occupational History    None   Tobacco Use    Smoking status: Never Smoker    Smokeless tobacco: Never Used   Vaping Use    Vaping Use: Never used   Substance and Sexual Activity    Alcohol use: No     Comment: rare    Drug use: No    Sexual activity: Not Currently   Other Topics Concern    None   Social History Narrative    ** Merged History Encounter **          Social Determinants of Health     Financial Resource Strain:     Difficulty of Paying Living Expenses: Not on file   Food Insecurity:     Worried About Running Out of Food in the Last Year: Not on file    Reid of Food in the Last Year: Not on file   Transportation Needs:     Lack of Transportation (Medical): Not on file    Lack of Transportation (Non-Medical):  Not on file   Physical Activity:     Days of Exercise per Week: Not on file    Minutes of Exercise per Session: Not on file   Stress:     Feeling of Stress : Not on file   Social Connections:     Frequency of Communication with Friends and Family: Not on file    Frequency of Social Gatherings with Friends and Family: Not on file    Attends [employer]ish Services: Not on file    Active Member of [address] [place] [address] [employer] or Organizations: Not on file    Attends Club or Organization Meetings: Not on file    Marital Status: Not on file   Intimate Partner Violence:     Fear of Current or Ex-Partner: Not on file    Emotionally Abused: Not on file    Physically Abused: Not on file    Sexually Abused: Not on file   Housing Stability:     Unable to Pay for Housing in the Last Year: Not on file    Number of [place] in the Last Year: Not on file    Unstable Housing in the Last Year: Not on file       SCREENINGS    [place] Coma Scale  Eye Opening: Spontaneous  Best Verbal Response: Confused  Best Motor Response: Obeys commands  [place] Coma Scale Score: 14        PHYSICAL EXAM    (up to 7 for level 4, 8 or more for level 5)     ED Triage Vitals [06/19/22 0815]   BP Temp Temp Source Heart Rate Resp SpO2 Height Weight   125/77 97.9 °F (36.6 °C) Oral (!) 109 14 100 % -- --       Physical Exam:      General Appearance:  Alert, cooperative, appears stated age. Head:  Normocephalic, without obvious abnormality, atraumatic. Eyes:  conjunctiva/corneas clear, EOM's intact. Sclera anicteric. ENT:  Mucous membranes are moist and pink   Neck: Supple, symmetrical, trachea midline, no adenopathy. No jugular venous distention. Lungs:    Clear to auscultation bilaterally   Chest Wall:   No pain to palpation   Heart:   Genitourinary:  Irregular rate and rhythm with no murmurs rubs gallops. Tachycardic. Normal   Abdomen:    Soft benign with no guarding or rebound   Extremities:  No clubbing cyanosis or edema   Pulses:  Good throughout   Skin:  No rashes or lesions to exposed skin. Neurologic: Alert and oriented X 3. DIAGNOSTIC RESULTS     EKG: All EKG's are interpreted by the Emergency Department Physician who either signs or Co-signsthis chart in the absence of a cardiologist.    Atrial fibrillation at a rate of 120 beats a minute with a rapid ventricular response. No ischemic changes noted. RADIOLOGY:   Non-plain filmimages such as CT, Ultrasound and MRI are read by the radiologist. Plain radiographic images are visualized and preliminarily interpreted by the emergency physician with the below findings:    See below    Interpretation per the Radiologist below, if available at the time ofthis note: All incidental findings were discussed with the patient. XR CHEST PORTABLE   Final Result   No acute cardiopulmonary disease.                ED BEDSIDE ULTRASOUND:   Performed by ED Physician - none    LABS:  Labs Reviewed   BASIC METABOLIC PANEL - Abnormal; Notable for the following components:       Result Value    Sodium 131 (*)     Chloride 96 (*)     Glucose 112 (*)     GFR Non- 46 (*)     GFR  56 (*)     All other components within normal limits   BRAIN NATRIURETIC PEPTIDE - Abnormal; Notable for the following components:    Pro-BNP 1,006 (*)     All other components within normal limits   URINALYSIS WITH REFLEX TO CULTURE - Abnormal; Notable for the following components:    Protein, UA TRACE (*)     Leukocyte Esterase, Urine TRACE (*)     All other components within normal limits   CBC WITH AUTO DIFFERENTIAL   LACTIC ACID   TROPONIN   MICROSCOPIC URINALYSIS       All other labs were within normal range or not returned as of this dictation. EMERGENCY DEPARTMENT COURSE and DIFFERENTIAL DIAGNOSIS/MDM:   Vitals:    Vitals:    06/19/22 0956 06/19/22 1145 06/19/22 1255 06/19/22 1301   BP: 124/72 117/81 122/72 112/67   Pulse: (!) 114 (!) 128 (!) 118 (!) 108   Resp: 14 16 14 16   Temp:       TempSrc:       SpO2: 100% 98% 98% 99%           MDM    The patient has remained stable throughout her hospital course. The patient's work-up included a CT of the head that shows no acute findings. However, the patient has been persistently tachycardic in her atrial fibrillation. Cardizem will be started. Her cardiac work-up was otherwise unremarkable. Laboratory results are unremarkable. She will be admitted for further care. She is on a Cardizem drip and her last heart rate was 108 bpm.  She is otherwise hemodynamically stable. REASSESSMENT          CRITICAL CARE TIME   Total Critical Care time was 45 minutes, excluding separatelyreportable procedures. There was a high probability ofclinically significant/life threatening deterioration in the patient's condition which required my urgent intervention. CONSULTS:  None    PROCEDURES:  Unless otherwise noted below, none     Procedures    FINAL IMPRESSION      1. Syncope and collapse    2.  Atrial fibrillation with RVR [facility])          DISPOSITION/PLAN   DISPOSITION Decision To Admit 06/19/2022 01:52:56 PM      PATIENT REFERREDTO:  No follow-up provider specified. DISCHARGEMEDICATIONS:  New Prescriptions    No medications on file     Controlled Substances Monitoring:     No flowsheet data found.     (Please note that portions of this note were completed with a voice recognition program.  Efforts were made to edit the dictations but occasionally words are mis-transcribed.)    Mono Lang MD (electronically signed)  Attending Emergency Physician          Mono Lang MD  06/19/22 7373

## 2022-06-20 VITALS
OXYGEN SATURATION: 97 % | HEART RATE: 58 BPM | TEMPERATURE: 98.3 F | DIASTOLIC BLOOD PRESSURE: 89 MMHG | RESPIRATION RATE: 16 BRPM | SYSTOLIC BLOOD PRESSURE: 145 MMHG | BODY MASS INDEX: 28.53 KG/M2 | WEIGHT: 161 LBS | HEIGHT: 63 IN

## 2022-06-20 PROBLEM — I95.9 HYPOTENSION: Status: ACTIVE | Noted: 2022-06-20

## 2022-06-20 PROBLEM — F03.90 DEMENTIA (HCC): Status: ACTIVE | Noted: 2022-06-20

## 2022-06-20 LAB
ANION GAP SERPL CALCULATED.3IONS-SCNC: 10 MMOL/L (ref 3–16)
BASOPHILS ABSOLUTE: 0.1 K/UL (ref 0–0.2)
BASOPHILS RELATIVE PERCENT: 0.8 %
BUN BLDV-MCNC: 19 MG/DL (ref 7–20)
CALCIUM SERPL-MCNC: 9.7 MG/DL (ref 8.3–10.6)
CHLORIDE BLD-SCNC: 100 MMOL/L (ref 99–110)
CO2: 22 MMOL/L (ref 21–32)
CREAT SERPL-MCNC: 0.9 MG/DL (ref 0.6–1.2)
EKG ATRIAL RATE: 59 BPM
EKG DIAGNOSIS: NORMAL
EKG P AXIS: 110 DEGREES
EKG P-R INTERVAL: 116 MS
EKG Q-T INTERVAL: 436 MS
EKG QRS DURATION: 84 MS
EKG QTC CALCULATION (BAZETT): 431 MS
EKG R AXIS: -30 DEGREES
EKG T AXIS: 179 DEGREES
EKG VENTRICULAR RATE: 59 BPM
EOSINOPHILS ABSOLUTE: 0.1 K/UL (ref 0–0.6)
EOSINOPHILS RELATIVE PERCENT: 1.4 %
GFR AFRICAN AMERICAN: >60
GFR NON-AFRICAN AMERICAN: 58
GLUCOSE BLD-MCNC: 113 MG/DL (ref 70–99)
HCT VFR BLD CALC: 44.3 % (ref 36–48)
HEMOGLOBIN: 14.6 G/DL (ref 12–16)
LYMPHOCYTES ABSOLUTE: 1.8 K/UL (ref 1–5.1)
LYMPHOCYTES RELATIVE PERCENT: 27.2 %
MCH RBC QN AUTO: 31.9 PG (ref 26–34)
MCHC RBC AUTO-ENTMCNC: 33 G/DL (ref 31–36)
MCV RBC AUTO: 96.6 FL (ref 80–100)
MONOCYTES ABSOLUTE: 0.4 K/UL (ref 0–1.3)
MONOCYTES RELATIVE PERCENT: 5.8 %
NEUTROPHILS ABSOLUTE: 4.4 K/UL (ref 1.7–7.7)
NEUTROPHILS RELATIVE PERCENT: 64.8 %
PDW BLD-RTO: 12.9 % (ref 12.4–15.4)
PLATELET # BLD: 173 K/UL (ref 135–450)
PMV BLD AUTO: 8.8 FL (ref 5–10.5)
POTASSIUM SERPL-SCNC: 4 MMOL/L (ref 3.5–5.1)
RBC # BLD: 4.58 M/UL (ref 4–5.2)
SODIUM BLD-SCNC: 132 MMOL/L (ref 136–145)
WBC # BLD: 6.7 K/UL (ref 4–11)

## 2022-06-20 PROCEDURE — 97116 GAIT TRAINING THERAPY: CPT

## 2022-06-20 PROCEDURE — 97162 PT EVAL MOD COMPLEX 30 MIN: CPT

## 2022-06-20 PROCEDURE — 99222 1ST HOSP IP/OBS MODERATE 55: CPT | Performed by: NURSE PRACTITIONER

## 2022-06-20 PROCEDURE — 85025 COMPLETE CBC W/AUTO DIFF WBC: CPT

## 2022-06-20 PROCEDURE — 97530 THERAPEUTIC ACTIVITIES: CPT

## 2022-06-20 PROCEDURE — 80048 BASIC METABOLIC PNL TOTAL CA: CPT

## 2022-06-20 PROCEDURE — 36415 COLL VENOUS BLD VENIPUNCTURE: CPT

## 2022-06-20 PROCEDURE — 6370000000 HC RX 637 (ALT 250 FOR IP): Performed by: INTERNAL MEDICINE

## 2022-06-20 PROCEDURE — 93010 ELECTROCARDIOGRAM REPORT: CPT | Performed by: INTERNAL MEDICINE

## 2022-06-20 PROCEDURE — 97165 OT EVAL LOW COMPLEX 30 MIN: CPT

## 2022-06-20 RX ADMIN — CHOLECALCIFEROL TAB 125 MCG (5000 UNIT) 5000 UNITS: 125 TAB at 08:18

## 2022-06-20 RX ADMIN — SENNOSIDES 17.2 MG: 8.6 TABLET, FILM COATED ORAL at 08:18

## 2022-06-20 RX ADMIN — PANTOPRAZOLE SODIUM 40 MG: 40 TABLET, DELAYED RELEASE ORAL at 06:44

## 2022-06-20 RX ADMIN — HYDROCHLOROTHIAZIDE 12.5 MG: 25 TABLET ORAL at 08:17

## 2022-06-20 RX ADMIN — ACETAMINOPHEN 1000 MG: 500 TABLET ORAL at 08:18

## 2022-06-20 RX ADMIN — LEVOTHYROXINE SODIUM 75 MCG: 0.07 TABLET ORAL at 06:45

## 2022-06-20 RX ADMIN — POTASSIUM CHLORIDE 10 MEQ: 750 TABLET, FILM COATED, EXTENDED RELEASE ORAL at 08:17

## 2022-06-20 RX ADMIN — CLOPIDOGREL BISULFATE 75 MG: 75 TABLET ORAL at 08:18

## 2022-06-20 RX ADMIN — AMIODARONE HYDROCHLORIDE 100 MG: 200 TABLET ORAL at 08:18

## 2022-06-20 ASSESSMENT — PAIN SCALES - GENERAL
PAINLEVEL_OUTOF10: 0

## 2022-06-20 NOTE — PLAN OF CARE
Problem: Discharge Planning  Goal: Discharge to home or other facility with appropriate resources  6/20/2022 1846 by Jluis Pritchett RN  Outcome: Completed  6/20/2022 1846 by Jluis Pritchett RN  Outcome: Progressing     Problem: Safety - Adult  Goal: Free from fall injury  6/20/2022 1846 by Jluis Pritchett RN  Outcome: Completed  6/20/2022 1846 by Jluis Pritchett RN  Outcome: Progressing     Problem: ABCDS Injury Assessment  Goal: Absence of physical injury  6/20/2022 1846 by Jluis Pritchett RN  Outcome: Completed  6/20/2022 1846 by Jluis Pritchett RN  Outcome: Progressing     Problem: Skin/Tissue Integrity  Goal: Absence of new skin breakdown  Description: 1. Monitor for areas of redness and/or skin breakdown  2. Assess vascular access sites hourly  3. Every 4-6 hours minimum:  Change oxygen saturation probe site  4. Every 4-6 hours:  If on nasal continuous positive airway pressure, respiratory therapy assess nares and determine need for appliance change or resting period.   6/20/2022 1846 by Jluis Pritchett RN  Outcome: Completed  6/20/2022 1846 by Jluis Pritchett RN  Outcome: Progressing     Problem: Pain  Goal: Verbalizes/displays adequate comfort level or baseline comfort level  6/20/2022 1846 by Jluis Pritchett RN  Outcome: Completed  6/20/2022 1846 by Jluis Pritchett RN  Outcome: Progressing     Problem: Cardiovascular - Adult  Goal: Absence of cardiac dysrhythmias or at baseline  6/20/2022 1846 by Jluis Pritchett RN  Outcome: Completed  6/20/2022 1846 by Jluis Pritchett RN  Outcome: Progressing

## 2022-06-20 NOTE — CONSULTS
Palliative Care:        H&P: 51-year-old white lady who came to [facility] emergency room with loss of consciousness. She arrived to the ER from [facility] with syncopal episode without any chest pain, shortness of breath. She is a poor historian. She is somewhat irritable. She presently denies any dizziness. She has underlying dementia  Admit 6/19, Consult 6/20    Past Medical History:   has a past medical history of Ataxia, Atrial fibrillation ([address] Depression, Dermatitis, Elevated blood sugar, Fatigue, Glaucoma, Glaucoma, Hyperglycemia, Hyperlipidemia, Hypertension, Hypothyroidism, Impaired fasting blood sugar, Impaired fasting glucose, Peripheral vascular disease ([address] Pulmonary hypertension, primary ([address] Thrombocytopenia ([address] Vitamin D deficiency, and Vitamin D deficiency. Past Surgical History:   has a past surgical history that includes Appendectomy; Ovary surgery; Tonsillectomy; and other surgical history (07/05/2018). Advance Directives:        DNR-CC; All ACPs identify code status - most recently valid identifies DNR-CC; no HCPOA  Extended Emergency Contact Information  Primary Emergency Contact: Wilberto Armstrong  Address: [address] [place] [address], [address] [place] [address],[address] Phone: [phone]  Mobile Phone: [phone]  Relation: Child  Secondary Emergency Contact: Francois Armstrong   [address] Phone: [phone]  Relation: Child    Problem Severity: Pain/Other Symptoms:        Weight 161#  Body mass index is 28.52 kg/m².     Bed Mobility/Toileting/Transfer:        No PT/OT notes this admit    Performance Status:        Palliative Performance Scale:  100% []Full Normal activity & work No evidence of disease  90%   [] Full Normal activity & work Some evidence of disease  80%   [] Full Normal activity with Effort Some evidence of disease  70%   [] Reduced Unable Normal Job/Work Significant disease Full Normal or reduced  60%   [] Ambulation reduced; Significant disease; Can't do hobbies/housework; intake normal   or reduced; occasional assist; LOC full/confusion  50%   [] Mainly sit/lie; Extensive disease; Can't do any work; Considerable assist; intake normal  Or reduced; LOC full/confusion  40%   [x] Mainly in bed; Extensive disease; Mainly assist; intake normal or reduced; occasional assist; LOC full/confusion  30%   [] Bed Bound; Extensive disease; Total care; intake reduced; LOC full/confusion  20%   [] Bed Bound; Extensive disease; Total care; intake minimal; Drowsy/coma  10%   [] Bed Bound; Extensive disease; Total care; Mouth care only; Drowsy/coma    PPS 40%    Symptom Assessment: Appetite/Nausea/Bowels/Fatigue:        At/near baseline per pt report and family confirmation    Intake/Output Summary (Last 24 hours) at 6/20/2022 1611  Last data filed at 6/20/2022 0646  Gross per 24 hour   Intake 480 ml   Output 100 ml   Net 380 ml       Social History:   reports that she has never smoked. She has never used smokeless tobacco. She reports that she does not drink alcohol and does not use drugs. Family History:  family history includes Heart Disease in her father; Other in her mother. Psychological/Spiritual:         Declined support at this time    Family Discussion:        All MD/RN notes and personal interaction indicate that pt is not capable of independent decision making. Conversation conducted with two of pt's three children, Steff Villalba and Accera. Provided education regarding hospice assistance in the environment of an assisted living facility. Sons agreed they would like to pursue hospice services for pt and chose [facility]. Pt is not dependent on hospice for discharge - may discharge as planned. RN informed. Orders updated. Referral sent, confirmed. Will follow up and support patient/family as time allows or circumstances dictate. Please reach out via [phone], [employer], or email [email or web address] if pt condition changes significantly or if family requests support. Thank you.     Electronically signed by Christine Caballero RN, BSN on 6/20/2022 at 4:18 PM

## 2022-06-20 NOTE — DISCHARGE INSTR - COC
Continuity of Care Form    Patient Name: Alexa Etienne   :  10/29/1929  MRN:  [id number]    Admit date:  2022  Discharge date:  2022    Code Status Order: DNR-CC   Advance Directives:      Admitting Physician:  Estiven Motley MD  PCP: Hilton Kramer MD    Discharging Nurse: [facility] Unit/Room#: 3AN-3310/3310-01  Discharging Unit Phone Number: [phone]    Emergency Contact:   Extended Emergency Contact Information  Primary Emergency Contact: Wilberto Armstrong  Address: [address] [place] [address], [address] [place] [address],[address] Phone: [phone]  Mobile Phone: [phone]  Relation: Child  Secondary Emergency Contact: Francois Armstrong   [address] Phone: [phone]  Relation: Child    Past Surgical History:  Past Surgical History:   Procedure Laterality Date    APPENDECTOMY      OTHER SURGICAL HISTORY  2018    RIGHT hip gamma nail     OVARY SURGERY      TONSILLECTOMY         Immunization History:   Immunization History   Administered Date(s) Administered    COVID-19, Pfizer Purple top, DILUTE for use, 12+ yrs, 30mcg/0.3mL dose 2021, 2021, 10/22/2021    Influenza Vaccine, unspecified formulation 2014, 2014, 09/10/2015, 09/10/2015    Influenza Virus Vaccine 2013, 11/10/2013, 10/01/2017    PPD Test 2013    Pneumococcal Conjugate 7-valent ([place]) 11/10/2013    Pneumococcal Polysaccharide ([id number]) 2012, 2012    Zoster Live (Zostavax) 2012, 2012       Active Problems:  Patient Active Problem List   Diagnosis Code    Hypothyroid E03.9    Paroxysmal atrial fibrillation ([address] I48.0    Syncope and collapse R55    Depression with anxiety F41.8    Hyperlipidemia E78.5    Ataxia R27.0    Atrial fibrillation with rapid ventricular response (HCC) I48.91    Dementia ([facility] F03.90    Hypotension I95.9       Isolation/Infection:   Isolation            No Isolation          Patient Infection Status Infection Onset Added Last Indicated Last Indicated By Review Planned Expiration Resolved Resolved By    None active    Resolved    COVID-19 (Rule Out) 02/05/21 02/05/21 02/05/21 COVID-19 (Ordered)   02/05/21 Rule-Out Test Resulted            Nurse Assessment:  Last Vital Signs: BP (!) 141/65   Pulse 53   Temp 97.6 °F (36.4 °C)   Resp 16   Ht 5' 3\" (1.6 m)   Wt 161 lb (73 kg)   SpO2 96%   BMI 28.52 kg/m²     Last documented pain score (0-10 scale): Pain Level: 0  Last Weight:   Wt Readings from Last 1 Encounters:   06/20/22 161 lb (73 kg)     Mental Status:  oriented, alert, and has some dementia, unaware why she was brought in the hospital    IV Access:  - None    Nursing Mobility/ADLs:  Walking   Assisted  Transfer  Assisted  Bathing  Assisted  Dressing  Assisted  Toileting  Assisted  Feeding  Independent, 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   Med Admin  Independent  Med Delivery   whole    Wound Care Documentation and Therapy:  Incision 07/05/18 Hip Right (Active)   Number of days: 3041        Elimination:  Continence: Bowel: Yes  Bladder: No  Urinary Catheter: None   Colostomy/Ileostomy/Ileal Conduit: No       Date of Last BM: n/a    Intake/Output Summary (Last 24 hours) at 6/20/2022 1100  Last data filed at 6/20/2022 0646  Gross per 24 hour   Intake 480 ml   Output 100 ml   Net 380 ml     I/O last 3 completed shifts: In: 480 [P.O.:480]  Out: 100 [Urine:100]    Safety Concerns:     History of Falls (last 30 days) and At Risk for Falls    Impairments/Disabilities:      Vision and Hearing    Nutrition Therapy:  Current Nutrition Therapy:   - Oral Diet:  General    Routes of Feeding: Oral  Liquids: No restriction  Daily Fluid Restriction: no  Last Modified Barium Swallow with Video (Video Swallowing Test): not done    Treatments at the Time of Hospital Discharge:   Respiratory Treatments: none  Oxygen Therapy:  is not on home oxygen therapy.   Ventilator:    - No ventilator support    Rehab Therapies:

## 2022-06-20 NOTE — PROGRESS NOTES
RN attempted to call report to [facility], RN left voicemail with call back number.

## 2022-06-20 NOTE — CARE COORDINATION
Discharge Planning Assessment    RN discharge planner spoke with Christopher Sanchez  in admissions at [facility] who stated patient is in Assisted Living  level of care     Pt status: []  LTC []  SNF [x]  AL  []  [place]     Bed Status:  Has bed hold    Pre-cert required: No    Level of function/Support:   Requires  Minimal assistance with ADLs,  Uses a walker for ambulation. Additional Information:  Patient can  go back to the facility when Medically stable no precert required    Transportation at the time of discharge:  Medical transport      Addendum; CM received a call from Christopher Sanchez with  [facility]  Admissions and also spoke to their director- Jayden Mullins stated that patient is from the Assisted Living level care will need to have PT& OT evaluations done to determine the level of care prior to going back . SAMIR informed the Rehabilitation Manager Ami Olsen to see if it is possible for patient to  be evaluated. Otherwise  time   is still scheduled for 1815.

## 2022-06-20 NOTE — PROGRESS NOTES
Patient Active Problem List   Diagnosis    Hypothyroid    Paroxysmal atrial fibrillation (HCC)    Syncope and collapse    Depression with anxiety    Hyperlipidemia    Ataxia    Atrial fibrillation with rapid ventricular response (HCC)    Dementia (HCC)    Hypotension   H&P dictated

## 2022-06-20 NOTE — PLAN OF CARE
Problem: Discharge Planning  Goal: Discharge to home or other facility with appropriate resources  Outcome: Progressing     Problem: Safety - Adult  Goal: Free from fall injury  Outcome: Progressing     Problem: ABCDS Injury Assessment  Goal: Absence of physical injury  Outcome: Progressing     Problem: Skin/Tissue Integrity  Goal: Absence of new skin breakdown  Description: 1. Monitor for areas of redness and/or skin breakdown  2. Assess vascular access sites hourly  3. Every 4-6 hours minimum:  Change oxygen saturation probe site  4. Every 4-6 hours:  If on nasal continuous positive airway pressure, respiratory therapy assess nares and determine need for appliance change or resting period.   Outcome: Progressing     Problem: Pain  Goal: Verbalizes/displays adequate comfort level or baseline comfort level  Outcome: Progressing     Problem: Cardiovascular - Adult  Goal: Absence of cardiac dysrhythmias or at baseline  Outcome: Progressing

## 2022-06-20 NOTE — PLAN OF CARE
Problem: Discharge Planning  Goal: Discharge to home or other facility with appropriate resources  Outcome: Progressing     Problem: Safety - Adult  Goal: Free from fall injury  Outcome: Progressing  Note: Fall precautions in place, bed alarm on, nonskid foot wear applied, bed in lowest position, and call light within reach. Will continue to monitor. Problem: Skin/Tissue Integrity  Goal: Absence of new skin breakdown  Description: 1. Monitor for areas of redness and/or skin breakdown  2. Assess vascular access sites hourly  3. Every 4-6 hours minimum:  Change oxygen saturation probe site  4. Every 4-6 hours:  If on nasal continuous positive airway pressure, respiratory therapy assess nares and determine need for appliance change or resting period. Outcome: Progressing  Note: No skin breakdown noted. Libby cared post bowel movement. Purewick in. HOB<20degrees. Heels elevated off bed. Encouraging turning and repositioning Q2H. Will continue to moniotr. Problem: Pain  Goal: Verbalizes/displays adequate comfort level or baseline comfort level  Outcome: Progressing     Problem: Cardiovascular - Adult  Goal: Absence of cardiac dysrhythmias or at baseline  Outcome: Progressing  Flowsheets (Taken 6/20/2022 0219)  Absence of cardiac dysrhythmias or at baseline:   Assess for signs of decreased cardiac output   Monitor cardiac rate and rhythm   Administer antiarrhythmia medication and electrolyte replacement as ordered  Note: Telemetry monitoring: Sinus bradycardia. VSS. Cardiology consulted. Will continue to monitor.

## 2022-06-20 NOTE — CARE COORDINATION
Discharge order noted. Boom Soliman in admissions at [facility] was informed of patient's discharge order who stated it is okay for patient to go back. Transport was scheduled with the  time of 1815 via [address] was  made aware of the  time. Patient's son -Chica Acharya was updated  too.

## 2022-06-20 NOTE — PROGRESS NOTES
Physical Therapy    [facility] [address]   Phone: [phone]    Physical Therapy    [x] Initial Evaluation            [] Daily Treatment Note         [] Discharge Summary      Patient: Raphael Ruano   : 10/29/1929   MRN: [id number]   Date of Service:  2022  Admitting Diagnosis: Atrial fibrillation with rapid ventricular response [facility])  Current Admission Summary: Raphael Ruano is a 80 y.o. female who presents to the emergency department after syncopal episode. This is a 24-year-old female who is hard of hearing who presents from an extended care facility after apparent syncopal episode. The patient also has a history of some dementia. Is unclear when this happened.     Past Medical History:  has a past medical history of Ataxia, Atrial fibrillation ([address] Depression, Dermatitis, Elevated blood sugar, Fatigue, Glaucoma, Glaucoma, Hyperglycemia, Hyperlipidemia, Hypertension, Hypothyroidism, Impaired fasting blood sugar, Impaired fasting glucose, Peripheral vascular disease ([address] Pulmonary hypertension, primary ([address] Thrombocytopenia ([address] Vitamin D deficiency, and Vitamin D deficiency. Past Surgical History:  has a past surgical history that includes Appendectomy; Ovary surgery; Tonsillectomy; and other surgical history (2018). Discharge Recommendations: Raphael Ruano scored a 17/24 on the AM-PAC short mobility form. Current research shows that an AM-PAC score of 17 or less is typically not associated with a discharge to the patient's home setting. Based on the patient's AM-PAC score and their current functional mobility deficits, it is recommended that the patient have 3-5 sessions per week of Physical Therapy at d/c to increase the patient's independence. Please see assessment section for further patient specific details. If patient discharges prior to next session this note will serve as a discharge summary.   Please see below for the latest assessment towards goals. DME Required For Discharge: DME to be determined at next level of care  Precautions/Restrictions: high fall risk  Weight Bearing Restrictions: no restrictions  [] Right Upper Extremity  [] Left Upper Extremity [] Right Lower Extremity  [] Left Lower Extremity     Required Braces/Orthotics: no braces required   [] Right  [] Left  Positional Restrictions:no positional restrictions    Pre-Admission Information   Lives With: spouse    Type of Home: long term care facility [facility] assisted living   Home Layout: one level  Home Access: level entry  Bathroom Layout: walk in shower showers 2x a week with aide   Toilet Height: elevated height  Bathroom Equipment: . Comment: NA  Home Equipment: rollator - 4 wheeled walker transport chair, lift chair   Transfer Assistance: Independent without use of device  Ambulation Assistance:modified independent with use of rollator   ADL Assistance: requires assistance with bathing, requires assistance with dressing  IADL Assistance: facility provides meals (usually a dining freeman but due to [employer] has been closed)  Active :        [] Yes  [x] No (son provides transportation)   Hand Dominance: [] Left  [] Right  Hobbies:   Recent Falls: no falls in last 6 months. 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 Pt agreeable to PT/OT eval. Pts son's present   Pain: 0/10  Pain Interventions: not applicable       Functional Mobility  Bed Mobility  Bed mobility not completed on this date. Comments: Pt in recliner at beginning and EOS   Transfers  Sit to stand transfer: moderate assistance  Stand to sit transfer: minimal assistance  Comments: From recliner. Strong posterior lean upon standing   Ambulation  Surface:level surface  Assistive Device: rolling walker  Assistance: minimal assistance  Distance: 40ft   Gait Mechanics: decreased loida, decreased step length, decreased step height, assist with RW navigation   Comments: Pt ambulates 40 ft with RW min A before seated rest break    Stair Mobility  Stair mobility not completed on this date. Comments:  Wheelchair Mobility:  No w/c mobility completed on this date.   Comments:  Balance  Static Sitting Balance: fair (+): maintains balance at SBA/supervision without use of UE support  Dynamic Sitting Balance: fair (+): maintains balance at SBA/supervision without use of UE support  Static Standing Balance: poor (+): requires min (A) to maintain balance  Dynamic Standing Balance: poor (+): requires min (A) to maintain balance  Comments:    Other Therapeutic Interventions    Functional Outcomes  AM-PAC Inpatient Mobility Raw Score : 17              Cognition  Overall Cognitive Status: Impaired  Following Commands: follows one step commands consistently  Memory: decreased recall of recent events, decreased short term memory, decreased long term memory  Safety Judgement: decreased awareness of need for assistance, decreased awareness of need for safety  Insights: decreased awareness of deficits  Orientation:    oriented to person  Command Following:   [facility]    Education  Barriers To Learning: cognition and hearing  Patient Education: patient educated on goals, PT role and benefits, plan of care, discharge recommendations  Learning Assessment:  patient verbalizes understanding, would benefit from continued reinforcement    Assessment  Activity Tolerance: Pt fatigues quickly with activity   Impairments Requiring Therapeutic Intervention: decreased functional mobility, decreased strength, decreased safety awareness, decreased cognition, decreased endurance, decreased balance, decreased posture  Prognosis: good  Clinical Assessment: Pt presents to hospital from assisted living with syncope/collapse. At this time, pt completes functional transfers with min-mod A and ambulates short distances with RW min A. Pt would continue to benefit from skilled PT to improve endurance, functional mobility and safely return to PLOF.    Safety Interventions: patient left in chair, chair alarm in place, call light within reach, gait belt, patient at risk for falls, nurse notified and family/caregiver present    Plan  Frequency: 3-5 x/per week  Current Treatment Recommendations: strengthening, balance training, functional mobility training, transfer training, gait training and endurance training    Goals  Patient Goals: none stated    Short Term Goals:  Time Frame: upon discharge   Patient will complete bed mobility at Independent   Patient will complete transfers at Independent   Patient will ambulate 150 ft with use of LRAD at modified independent    Therapy Session Time      Individual Group Co-treatment   Time In     1630   Time Out     1653   Minutes     23     Timed Code Treatment Minutes:   8  Total Treatment Minutes:  23       Electronically Signed By: Asad Dutton Do [address], [place] [address]

## 2022-06-20 NOTE — PROGRESS NOTES
[facility] [address] Department   Phone: [phone]    Occupational Therapy    [x] Initial Evaluation            [] Daily Treatment Note         [] Discharge Summary      Patient: Marina Javier   : 10/29/1929   MRN: [id number]   Date of Service:  2022    Admitting Diagnosis:  Atrial fibrillation with rapid ventricular response [facility])  Current Admission Summary: 44-year-old white lady who came to [facility] emergency room with loss of consciousness.  She arrived to the ER from [facility] with syncopal episode without any chest pain, shortness of breath.  She has underlying dementia  Past Medical History:  has a past medical history of Ataxia, Atrial fibrillation (HCC), Depression, Dermatitis, Elevated blood sugar, Fatigue, Glaucoma, Glaucoma, Hyperglycemia, Hyperlipidemia, Hypertension, Hypothyroidism, Impaired fasting blood sugar, Impaired fasting glucose, Peripheral vascular disease ([address] Pulmonary hypertension, primary ([address] Thrombocytopenia ([address] Vitamin D deficiency, and Vitamin D deficiency. Past Surgical History:  has a past surgical history that includes Appendectomy; Ovary surgery; Tonsillectomy; and other surgical history (2018). Discharge Recommendations: Marina Javier scored a 15/24 on the AM-PAC ADL Inpatient form. Current research shows that an AM-PAC score of 17 or less is typically not associated with a discharge to the patient's home setting. Based on the patient's AM-PAC score and their current ADL deficits, it is recommended that the patient have 3-5 sessions per week of Occupational Therapy at d/c to increase the patient's independence. Please see assessment section for further patient specific details. If patient discharges prior to next session this note will serve as a discharge summary. Please see below for the latest assessment towards goals.        DME Required For Discharge: no DME required at discharge    Precautions/Restrictions: high fall risk    Pre-Admission Information   Lives With: spouse                  Type of Home: long term care facility [facility] assisted living   Home Layout: one level  Home Access: level entry   [address]: walk in shower showers 2x a week with aide   Toilet Height: elevated height  Home Equipment: rollator - 4 wheeled walker transport chair, lift chair   Transfer Assistance: Independent without use of device  Ambulation Assistance:modified independent with use of rollator   ADL Assistance: requires assistance with bathing, requires assistance with dressing  IADL Assistance: facility provides meals (usually a dining freeman but due to [employer] has been closed)  Active :        []? Yes                 [x]? No (son provides transportation)   Hand Dominance: []? Left                 []? Right  Hobbies:   Recent Falls: no falls in last 6 months. Social hx obtained from son present in room due to pts cognitive status and [facility]     Examination   Vision:   Visual History: glaucoma, macular degeneration, other - son reports pt nearly blind  Hearing:   hard of hearing, left hearing aid, right hearing aid -- does not wear  Perception:   WFL  ROM:   (B) UE AROM WFL    Decision Making: low complexity  Clinical Presentation: evolving      Subjective  General: Pt seated in recliner with sons present, agreeable to evaluation, denies pain  Pain: 0/10  Pain Interventions: not applicable        Activities of Daily Living  Basic Activities of Daily Living  General Comments: None completed this date  Instrumental Activities of Daily Living  No IADL completed on this date. Functional Mobility  Bed Mobility  Bed mobility not completed on this date. Comments:  Transfers  Sit to stand transfer:moderate assistance  Stand to sit transfer: minimal assistance  Comments:  Functional Mobility:  Sitting Balance: stand by assistance.     Standing Balance: minimal assistance. Functional Mobility: rolling walker. minimal assistance. Activity: 40' increased time required d/t slow loida    Other Therapeutic Interventions    Functional Outcomes  AM-PAC Inpatient Daily Activity Raw Score: 15    Cognition  Overall Cognitive Status: Impaired  Following Commands: follows one step commands consistently  Memory: decreased recall of recent events, decreased short term memory, decreased long term memory  Safety Judgement: decreased awareness of need for assistance, decreased awareness of need for safety  Insights: decreased awareness of deficits  Orientation:    oriented to person, disoriented to place, disoriented to time  and disoriented to situation  Command Following:   accurately follows one step commands     Education  Barriers To Learning: cognition and hearing  Patient Education: patient educated on goals, OT role and benefits, plan of care, transfer training, discharge recommendations  Learning Assessment:  patient will require reinforcement due to cognitive deficits, patient is not an independent learner    Assessment  Activity Tolerance: Tolerated well  Impairments Requiring Therapeutic Intervention: decreased functional mobility, decreased ADL status, decreased cognition, decreased endurance, decreased balance, decreased IADL  Prognosis: good  Clinical Assessment: Pt presents slightly below baseline d/t above deficits--normally mod I for mobility with RW and IND transfers with assist for ADL as needed at facility, currently requires mod A for transfers and min A for amb with RW.  Continued OT indicated to facilitate return to PLOF  Safety Interventions: patient left in chair, chair alarm in place, call light within reach, gait belt, nurse notified and family/caregiver present    Plan  Frequency: 3-5 x/per week  Current Treatment Recommendations: strengthening, balance training, functional mobility training, transfer training, endurance training, patient/caregiver education, ADL/self-care training, IADL training, home management training, safety education and equipment evaluation/education    Goals     Short Term Goals:  Time Frame: by discharge  Patient will complete toileting at supervision   Patient will complete grooming at Independent   Patient will complete functional transfers at stand by assistance   Patient will complete functional mobility at stand by assistance     Therapy Session Time     Individual Group Co-treatment   Time In    1631   Time Out    1654   Minutes    23        Timed Code Treatment Minutes:   8 minutes  Total Treatment Minutes:  23 minutes       Electronically Signed By: GHANSHYAM Vasquez, [address] [place] [address] OTR/L [id number]

## 2022-06-20 NOTE — PROGRESS NOTES
Data- discharge order received, pt or son (appointed legal authority) verbalized agreement to discharge, disposition to [address] [place] [address] #[id number], [address] [place] [address] reviewed and signed by physician. Action- AVS prepared, REA completed/ reported faxed by case management/. Discharge instruction summary: Diet- regular, Activity- up as tolerated, immunizations reviewed and pdated, medications prescriptions to be filled at receiving facility except for the controlled prescriptions to be sent: n/a, Transfer code status: DNR-CC, LDAs to remain with discharge: none. DME used: none    Response- Bedside RN to call report to receiving facility. Pt belongings gathered, peripheral IV and cardiac monitoring removed. Disposition to Discharged e to skilled nursing by EMS transportation, no complications reported. 1. WEIGHT: Admit Weight: 160 lb (72.6 kg) (06/19/22 1742)        Today  Weight: 161 lb (73 kg) (06/20/22 0630)       2.  O2 SAT.: SpO2: 97 % (06/20/22 1600)

## 2022-06-20 NOTE — CARE COORDINATION
Discharge Plan:   Patient discharged to:   [facility]   Phone:  [phone]  Fax:  [phone]  SW/DC Planner faxed, [address] and AVS   Narcotic Prescriptions faxed were: None  RN: Lisa De La Garza will call report       Medical Transport with: [facility] up time: 1815  Family advised of discharge?: Yes- son - Gaudencio King?:   N/A    All discharge needs met per case management.

## 2022-06-20 NOTE — H&P
[address]                     [address] [place] [address], [address]                              HISTORY AND PHYSICAL    PATIENT NAME: Nissa Cade                    :        10/29/1929  MED REC NO:   [id number]                          ROOM:       3310  ACCOUNT NO:   [de-identified]                           ADMIT DATE: 2022  PROVIDER:     Elver Louis MD    HISTORY OF PRESENT ILLNESS:  The patient is a 57-year-old white lady who  came to [facility] emergency room with loss of consciousness. She  arrived to the ER _____ from [facility] with syncopal  episode without any chest pain, shortness of breath. She is a poor  historian. She is somewhat irritable. She presently denies any  dizziness. She has underlying dementia. PAST MEDICAL HISTORY:  Pertinent for ataxia, atrial fibrillation,  depression, dermatitis, elevated blood sugar, fatigue, glaucoma,  hyperglycemia, hyperlipidemia, hypertension, hypothyroidism, fasting  glucose, peripheral vascular disease, pulmonary hypertension,  thrombocytopenia, vitamin D deficiency. PAST SURGICAL HISTORY:  Pertinent for right-sided hip gamma nail,  appendectomy, ovarian surgery and tonsillectomy. FAMILY HISTORY:  Both the parents are . Father had  atherosclerotic heart disease. Mother  of known natural cause. SOCIAL HISTORY:  She is a . She has five grown children,. She  quit smoking and drinking about 36 years ago. She was always a  homemaker. She does not have any history of substance abuse. CURRENT MEDICATIONS:  The patient is on amiodarone, Tylenol, Plavix,  diltiazem, HydroDIURIL, levothyroxine, melatonin, pantoprazole,  potassium chloride, Senokot, trazodone, vitamin D3.    REVIEW OF SYSTEMS:  Negative for seizure activity. No incontinence. The patient ambulates with assistance. No recent exertional angina. No  orthopnea.   No paroxysmal nocturnal dyspnea. No hematemesis or melena. No abdominal pain. No genitourinary complaints. PHYSICAL EXAMINATION:  GENERAL:  Awake, alert and oriented x[de-identified] 80-year-old disoriented elderly  white woman. VITAL SIGNS:  Temperature 97.7, blood pressure 93/51, respiration is 18,  heart rate 57, O2 sat 95%, initially heart rate was 128. HEENT:  Oral mucosa is dry. SKIN:  Warm and dry. NECK:  Supple. Faint carotid bruits. No jugular venous distention. LUNGS:  Vesicular breath sounds. Poor inspiration. No crackles or  wheezing. HEART:  Regular rate and rhythm, S1 and S2.  1/6 systolic ejection  murmur. No gallop rhythm. ABDOMEN:  Soft and nontender. Bowel sounds are present. EXTREMITIES:  Shows trace edema. NEUROLOGIC:  Grossly intact. LABORATORY DATA:  Lab evaluation showed sodium 131, potassium 3.9,  chloride 96, CO2 24, BUN 17, creatinine 1.1, anion gap is 11, GFR is 46,  blood glucose is 112, proBNP level 1006, troponin is less than 0.01,  white blood cell count 6.6, hemoglobin and hematocrit is 14.6 and 45.2,  and platelet count 823. Urinalysis is negative for UTI. DIAGNOSTIC DATA:  Chest x-ray shows no acute cardiopulmonary disease. EKG initially showed atrial fibrillation with rapid ventricular response  now with occasionally sinus bradycardia. ASSESSMENT:  Status post atrial fibrillation with rapid ventricular  response, hypotension, syncope, paroxysmal atrial fibrillation,  hypothyroidism, bradycardia, hyperlipidemia and dementia. PLAN:  Get her admitted. The patient initially was treated with IV  diltiazem, now it is on hold, DVT prophylaxis and stroke prophylaxis. The patient is DNR comfort care. A transthoracic echo was done back in  2018, LVEF at that time was 60%, mild tricuspid regurgitation with  estimated RVSP at 46 mmHg with normal diastolic dysfunction.         Delmi Herron MD    D: 06/20/2022 1:53:32       T: 06/20/2022 6:06:29     [email or web address]  Job#: [id number]     Doc#: [id number]    CC:

## 2022-06-20 NOTE — CONSULTS
[facility]   Electrophysiology Consult Note  Date: 6/20/2022   Date of admission: 6/19/2022  7:50 AM  Reason for Admission: Syncope and collapse [R55]  Atrial fibrillation with rapid ventricular response ([address] [I48.91]  Atrial fibrillation with RVR ([address] [I48.91]    Consult Requesting Physician: Josee Justice MD    -Reason for Consultation: LOC    Chief Complaint   Patient presents with    Loss of Consciousness     Patient arrived to ER by squad from [facility] with syncopal episode. No other information obtained from squad. ECF did not call. Hx dementia      HISTORY OF PRESENT ILLNESS: History obtained from patient and medical record. Nhi Rivera is a 80 y.o. female with a past medical history of hypertension, hyperlipidemia, hypothyroidism, PVD, blind, SDH 6/2020 after a fall, thrombocytopenia, pulmonary hypertension, and atrial fibrillation not on AC due to frequent falls with injury. Has followed with Dr. Walker Hobson in the past last seen in 2018 and a PPM was recommended at that time. Amiodarone was decreased to 100 mg daily. Reportedly she had syncopal episode at [facility]. Unfortunately patient is a poor historian and there is little information regarding the syncopal episode available. Patient is not aware of why she is in the hospital. Holden Mcclellan she declines CP, SOB, palpitations, or dizziness. She is intermittently irritable and offers little insight due to baseline dementia. She is a DNR-CC. She was noted to also have AF with RVR to 120 and she converted to SB in the ER. Remains SB in the 50's with no severe bradycardia noted. Called [facility] and discussed event with staff. Reportedly she was found in bed in the am \"unresponsive\". When the caregiver rubbed her arm she did open her eyes. Reported to have a blank stare when opening eyes, patient told staff Viviane Garzon was dying. \" Staff reportedly did not assess vital signs.   They did call 911 and she was transported to the hospital.     Called and discussed case with rema Dias who has discussed the event with her primary nurse at the time. Repotedly she was found unresponsive and diaphoretic with BP low in the am. He reports that there has been a recent decline per nursing. Confirms DNR-CC status and they do NOT want any invasive procedures. Patient seen and examined. Clinical notes reviewed. Telemetry reviewed. Assessment and Plan:   AMS/Syncope? - Reportedly she had episode of unresponsiveness accompanied by diaphoresis and low BP    - She was initially noted to be in AF with RVR up to 120 and she converted shortly after admission to SB. She has been SB this admission without any significant bradycardia   - She has baseline dementia  Bradycardia   - Bradycardia with HR 50's on telemetry   - She has had more significant bradycardia in the past and in 2018 she was recommended for PPM implantation. The family decided against PM implantation. Discussed with rema Dias today and they do NOT not want any invasive procedures including PPM.   PAF   - Converted to SB   - On amiodarone 100 mg daily which was reduced due to bradycardia in 2018 and PPM was recommended   - Not a good candidate for [facility] [place] operated by [facility] due to falls with injury including SDH   - She is on the maximum tolerated dose of amiodarone given baseline bradycardia and hx of syncope/falls. Hypertension   - Reportedly blood pressure was low the morning of her episode   - Would stop HCTZ     - Consider palliative care consult  - Per family wished will not proceed with any aggressive workup  - Consider stopping HCTZ  - No further EP recommendation, will sign off    All pertinent information and plan of care discussed with the EP physician. All questions and concerns were addressed to the patient/family. Alternatives to my treatment were discussed.  I have discussed the above stated plan and the patient verbalized understanding and agreed with the plan. Discussed plan with family and nurse. Problem List:   Patient Active Problem List    Diagnosis Date Noted    Dementia ([facility] 06/20/2022    Hypotension 06/20/2022    Atrial fibrillation with rapid ventricular response ([facility] 06/19/2022    Ataxia 04/27/2020    Syncope and collapse 07/15/2018    Paroxysmal atrial fibrillation ([facility]     Hypothyroid 02/10/2014    Hyperlipidemia     Depression with anxiety       Allergies: Allergies   Allergen Reactions    Shellfish-Derived Products     Shrimp Flavor     Timolol     Tramadol     Sulfa Antibiotics Rash    Sulfa Antibiotics Rash     Other reaction(s): Unknown  As a child     Home Meds:  Prior to Visit Medications    Medication Sig Taking?  Authorizing Provider   hydroCHLOROthiazide (MICROZIDE) 12.5 MG capsule Take 12.5 mg by mouth daily Yes Historical Provider, MD   traZODone (DESYREL) 50 MG tablet Take 0.5 tablets by mouth nightly Take 1/2 tab at bedtime  Patient taking differently: Take 50 mg by mouth nightly   Maxime Hopper MD   melatonin 3 MG TABS tablet Take 9 mg by mouth nightly   Historical Provider, MD   omeprazole (PRILOSEC) 20 MG delayed release capsule Take 20 mg by mouth every morning (before breakfast)   Historical Provider, MD   potassium chloride (MICRO-K) 10 MEQ extended release capsule Take 10 mEq by mouth daily  Historical Provider, MD   sodium chloride (OCEAN, BABY AYR) 0.65 % nasal spray 1 spray by Nasal route as needed for Congestion  Patient not taking: Reported on 6/19/2022  Historical Provider, MD   hydrALAZINE (APRESOLINE) 25 MG tablet Take 1 tablet by mouth every 8 hours  Patient taking differently: Take 12.5 mg by mouth daily   Maxime Hopper MD   clopidogrel (PLAVIX) 75 MG tablet Take 1 tablet by mouth daily  Maxime Hopper MD   acetaminophen (TYLENOL) 325 MG tablet Take 1,000 mg by mouth 2 times daily   Historical Provider, MD   diclofenac sodium (VOLTAREN) 1 % GEL Apply 2 g topically 2 times daily as needed for Pain  Historical Provider, MD   amiodarone (CORDARONE) 200 MG tablet Take 0.5 tablets by mouth daily  Doris Cardona MD   Lift Chair MISC by Does not apply route  Hector Victoria MD   senna (SENOKOT) 8.6 MG tablet Take 2 tablets by mouth 2 times daily   Historical Provider, MD   Cholecalciferol (VITAMIN D3) 5000 units TABS Take 1 tablet by mouth every other day   Historical Provider, MD   levothyroxine (SYNTHROID) 75 MCG tablet TAKE 1 TABLET BY MOUTH EVERY DAY  Patient taking differently: every morning (before breakfast)   Arlet Rosado MD      Past Medical History:  Past Medical History:   Diagnosis Date    Ataxia     Atrial fibrillation ([address]     Depression 05/04/2011    Dermatitis 07/13/2012    Elevated blood sugar 244.9    Fatigue 02/252/2010    Glaucoma     Glaucoma     Hyperglycemia     Hyperlipidemia     Hypertension     Hypothyroidism     Impaired fasting blood sugar 05/04/2011    Impaired fasting glucose     Peripheral vascular disease [facility]     Pulmonary hypertension, primary ([facility] 04/19/2010    Thrombocytopenia ([facility]     Vitamin D deficiency     Vitamin D deficiency 07/06/2010      Past Surgical History:    has a past surgical history that includes Appendectomy; Ovary surgery; Tonsillectomy; and other surgical history (07/05/2018). Social History:  Reviewed. reports that she has never smoked. She has never used smokeless tobacco. She reports that she does not drink alcohol and does not use drugs. Family History:  Reviewed. family history includes Heart Disease in her father; Other in her mother.    Denies family history of sudden cardiac death, arrhythmia, premature CAD    Review of System:  Due to AMS/Dementia all available ROS is in HPI    Physical Examination:  Vitals:    06/20/22 0800   BP: (!) 141/65   Pulse: 53   Resp: 16   Temp: 97.6 °F (36.4 °C)   SpO2: 96%      In: 480 [P.O.:480]  Out: 100    Wt Readings from Last 3 Encounters:   06/20/22 161 lb (73 kg)   02/04/21 176 lb 4.8 oz (80 kg)   05/12/20 165 lb (74.8 kg)      Constitutional: Cooperative and in no apparent distress, and appears well nourished   Skin: Warm and pink; no cyanosis or bruising   HEENT: Symmetric and normocephalic. Conjunctiva pink with clear sclera. Mucus membranes pink and moist. No visible masses/goiter   Respiratory: Respirations symmetric and unlabored. Lungs clear to auscultation bilaterally, no wheezing, rhonchi, or crackles.  Cardiovascular:  regular rate and rhythm. S1 & S2 present, negative for murmur. negative elevation of JVP. No significant peripheral edema.  Musculoskeletal:  No focal weakness.  Neurological/Psych: Awake and orientated to person. Confusion at baseline.  + intermittent agitation    Pertinent labs, diagnostic, device, and imaging results reviewed as a part of this visit     Labs:  BMP:   Recent Labs     06/19/22 0805 06/20/22 0918   * 132*   K 3.9 4.0   CL 96* 100   CO2 24 22   BUN 17 19   CREATININE 1.1 0.9     Estimated Creatinine Clearance: 38 mL/min (based on SCr of 0.9 mg/dL).    CBC:   Recent Labs     06/19/22 0805 06/20/22 0918   WBC 6.6 6.7   HGB 14.6 14.6   HCT 45.2 44.3   MCV 96.3 96.6    173     Thyroid:   Lab Results   Component Value Date    TSH 3.94 12/09/2015    O5ITACR 0.82 11/30/2010    Q1LTNHY 0.82 11/30/2010    C7CJGQS 7.0 02/12/2014     Lipids:  Lab Results   Component Value Date    CHOL 176 02/11/2014    HDL 47 02/11/2014    HDL 49 06/04/2012    TRIG 67 02/11/2014     LFTS:   Lab Results   Component Value Date    ALT 8 02/04/2021    AST 15 02/04/2021    ALKPHOS 92 02/04/2021    PROT 6.8 02/04/2021    PROT 7.0 01/09/2013    AGRATIO 1.7 02/04/2021    BILITOT 0.3 02/04/2021     Cardiac Enzymes:   Lab Results   Component Value Date    CKTOTAL 150 02/12/2014    CKMB 4.9 02/12/2014    TROPONINI <0.01 06/19/2022    TROPONINI <0.01 02/04/2021    TROPONINI <0.01 04/26/2020     Coags:   Lab Results   Component Value Date    PROTIME 11.7 04/26/2020 INR 1.01 2020     EC2022: SB     ECHO:  2018   Summary   -Left ventricular cavity size is normal.   -There is increased left ventricular wall thickness.   -Ejection fraction is visually estimated to be 55-60%. -No regional wall motion abnormalities are noted. -Mild aortic regurgitation.   -Mild tricuspid regurgitation with a estimated RVSP of 46 mmHg.   -Normal diastolic function. Stress Test: 2018       Summary    Normal myocardial perfusion study.    Normal LV size and systolic function. Cath: none    Thank you for allowing to us to participate in the care of Nate Still.     MELANIE Rebolledo-CNP  [facility]   Office: [phone]

## 2022-06-21 ENCOUNTER — TELEPHONE (OUTPATIENT)
Dept: FAMILY MEDICINE CLINIC | Age: 87
End: 2022-06-21

## 2022-06-21 NOTE — TELEPHONE ENCOUNTER
[facility] Initial Follow Up Call    Outreach made within 2 business days of discharge: Yes    Patient: Moon Oliva Patient : 10/29/1929   MRN: [id number]  Reason for Admission: There are no discharge diagnoses documented for the most recent discharge. Discharge Date: 22       Spoke with: CHACE    Discharge department/facility: [facility]    TCM Interactive Patient Contact:  Was patient able to fill all prescriptions: NA  Was patient instructed to bring all medications to the follow-up visit: NA  Is patient taking all medications as directed in the discharge summary? NA  Does patient understand their discharge instructions: NA  Does patient have questions or concerns that need addressed prior to 7-14 day follow up office visit: CHACE    Scheduled appointment with PCP within 7-14 days    Follow Up  No future appointments. Signed          Discharge Plan:   Patient discharged to:   [facility]  Phone:  [phone]  Fax:  [phone] [facility] WILL FOLLOW UP WITH HER CARE. PT IS NOT SEEN IN OUR OFFICE.     Jayden Paredes MA

## 2022-06-26 LAB
EKG ATRIAL RATE: 59 BPM
EKG DIAGNOSIS: NORMAL
EKG P AXIS: 110 DEGREES
EKG P-R INTERVAL: 116 MS
EKG Q-T INTERVAL: 436 MS
EKG QRS DURATION: 84 MS
EKG QTC CALCULATION (BAZETT): 431 MS
EKG R AXIS: -30 DEGREES
EKG T AXIS: 179 DEGREES
EKG VENTRICULAR RATE: 59 BPM

## 2022-06-26 PROCEDURE — 93010 ELECTROCARDIOGRAM REPORT: CPT | Performed by: INTERNAL MEDICINE

## 2022-07-02 ENCOUNTER — APPOINTMENT (OUTPATIENT)
Dept: CT IMAGING | Age: 87
DRG: 087 | End: 2022-07-02
Payer: MEDICARE

## 2022-07-02 ENCOUNTER — HOSPITAL ENCOUNTER (INPATIENT)
Age: 87
LOS: 1 days | Discharge: NURSING, REHAB OR LONG-TERM CARE | DRG: 087 | End: 2022-07-04
Attending: EMERGENCY MEDICINE | Admitting: INTERNAL MEDICINE
Payer: MEDICARE

## 2022-07-02 DIAGNOSIS — S01.01XA LACERATION OF SCALP, INITIAL ENCOUNTER: ICD-10-CM

## 2022-07-02 DIAGNOSIS — I61.5 INTRAVENTRICULAR HEMORRHAGE (HCC): Primary | ICD-10-CM

## 2022-07-02 LAB
ANION GAP SERPL CALCULATED.3IONS-SCNC: 11 MMOL/L (ref 3–16)
BASOPHILS ABSOLUTE: 0.1 K/UL (ref 0–0.2)
BASOPHILS RELATIVE PERCENT: 0.9 %
BUN BLDV-MCNC: 17 MG/DL (ref 7–20)
CALCIUM SERPL-MCNC: 10.3 MG/DL (ref 8.3–10.6)
CHLORIDE BLD-SCNC: 100 MMOL/L (ref 99–110)
CO2: 25 MMOL/L (ref 21–32)
CREAT SERPL-MCNC: 1.1 MG/DL (ref 0.6–1.2)
EOSINOPHILS ABSOLUTE: 0.1 K/UL (ref 0–0.6)
EOSINOPHILS RELATIVE PERCENT: 1.8 %
GFR AFRICAN AMERICAN: 56
GFR NON-AFRICAN AMERICAN: 46
GLUCOSE BLD-MCNC: 98 MG/DL (ref 70–99)
HCT VFR BLD CALC: 40.6 % (ref 36–48)
HEMOGLOBIN: 13.5 G/DL (ref 12–16)
LYMPHOCYTES ABSOLUTE: 2.1 K/UL (ref 1–5.1)
LYMPHOCYTES RELATIVE PERCENT: 29.8 %
MCH RBC QN AUTO: 31.7 PG (ref 26–34)
MCHC RBC AUTO-ENTMCNC: 33.1 G/DL (ref 31–36)
MCV RBC AUTO: 95.7 FL (ref 80–100)
MONOCYTES ABSOLUTE: 0.5 K/UL (ref 0–1.3)
MONOCYTES RELATIVE PERCENT: 7.5 %
NEUTROPHILS ABSOLUTE: 4.1 K/UL (ref 1.7–7.7)
NEUTROPHILS RELATIVE PERCENT: 60 %
PDW BLD-RTO: 12.9 % (ref 12.4–15.4)
PLATELET # BLD: 175 K/UL (ref 135–450)
PMV BLD AUTO: 9.4 FL (ref 5–10.5)
POTASSIUM SERPL-SCNC: 4.2 MMOL/L (ref 3.5–5.1)
RBC # BLD: 4.24 M/UL (ref 4–5.2)
SODIUM BLD-SCNC: 136 MMOL/L (ref 136–145)
WBC # BLD: 6.9 K/UL (ref 4–11)

## 2022-07-02 PROCEDURE — 6370000000 HC RX 637 (ALT 250 FOR IP): Performed by: NURSE PRACTITIONER

## 2022-07-02 PROCEDURE — 99285 EMERGENCY DEPT VISIT HI MDM: CPT

## 2022-07-02 PROCEDURE — 70450 CT HEAD/BRAIN W/O DYE: CPT

## 2022-07-02 PROCEDURE — 80048 BASIC METABOLIC PNL TOTAL CA: CPT

## 2022-07-02 PROCEDURE — 36415 COLL VENOUS BLD VENIPUNCTURE: CPT

## 2022-07-02 PROCEDURE — 72125 CT NECK SPINE W/O DYE: CPT

## 2022-07-02 PROCEDURE — 12001 RPR S/N/AX/GEN/TRNK 2.5CM/<: CPT

## 2022-07-02 PROCEDURE — 85025 COMPLETE CBC W/AUTO DIFF WBC: CPT

## 2022-07-02 RX ORDER — ACETAMINOPHEN 500 MG
1000 TABLET ORAL ONCE
Status: COMPLETED | OUTPATIENT
Start: 2022-07-02 | End: 2022-07-02

## 2022-07-02 RX ORDER — LIDOCAINE HYDROCHLORIDE 10 MG/ML
INJECTION, SOLUTION EPIDURAL; INFILTRATION; INTRACAUDAL; PERINEURAL
Status: DISPENSED
Start: 2022-07-02 | End: 2022-07-03

## 2022-07-02 RX ADMIN — ACETAMINOPHEN 1000 MG: 500 TABLET ORAL at 23:17

## 2022-07-02 ASSESSMENT — PAIN DESCRIPTION - PAIN TYPE: TYPE: ACUTE PAIN

## 2022-07-02 ASSESSMENT — PAIN - FUNCTIONAL ASSESSMENT: PAIN_FUNCTIONAL_ASSESSMENT: 0-10

## 2022-07-02 ASSESSMENT — ENCOUNTER SYMPTOMS
CHEST TIGHTNESS: 0
NAUSEA: 0
ABDOMINAL PAIN: 0
SHORTNESS OF BREATH: 0
VOMITING: 0
DIARRHEA: 0

## 2022-07-02 ASSESSMENT — PAIN SCALES - GENERAL
PAINLEVEL_OUTOF10: 9
PAINLEVEL_OUTOF10: 8

## 2022-07-02 ASSESSMENT — LIFESTYLE VARIABLES
HOW OFTEN DO YOU HAVE A DRINK CONTAINING ALCOHOL: MONTHLY OR LESS
HOW MANY STANDARD DRINKS CONTAINING ALCOHOL DO YOU HAVE ON A TYPICAL DAY: 1 OR 2

## 2022-07-02 ASSESSMENT — PAIN DESCRIPTION - ORIENTATION
ORIENTATION: POSTERIOR
ORIENTATION: POSTERIOR

## 2022-07-02 ASSESSMENT — PAIN DESCRIPTION - LOCATION
LOCATION: HEAD;NECK
LOCATION: HEAD

## 2022-07-02 ASSESSMENT — PAIN DESCRIPTION - DESCRIPTORS: DESCRIPTORS: ACHING

## 2022-07-02 ASSESSMENT — PAIN DESCRIPTION - FREQUENCY: FREQUENCY: CONTINUOUS

## 2022-07-02 ASSESSMENT — PAIN DESCRIPTION - ONSET: ONSET: ON-GOING

## 2022-07-03 ENCOUNTER — APPOINTMENT (OUTPATIENT)
Dept: CT IMAGING | Age: 87
DRG: 087 | End: 2022-07-03
Payer: MEDICARE

## 2022-07-03 PROBLEM — R51.9 HEADACHE: Status: ACTIVE | Noted: 2022-07-03
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
## 2022-07-03 PROBLEM — I61.5 INTRAVENTRICULAR HEMORRHAGE (HCC): Status: ACTIVE | Noted: 2022-07-03

## 2022-07-03 PROBLEM — S01.01XA SCALP LACERATION: Status: ACTIVE | Noted: 2022-07-03

## 2022-07-03 LAB
ANION GAP SERPL CALCULATED.3IONS-SCNC: 13 MMOL/L (ref 3–16)
BASOPHILS ABSOLUTE: 0.1 K/UL (ref 0–0.2)
BASOPHILS RELATIVE PERCENT: 1 %
BUN BLDV-MCNC: 16 MG/DL (ref 7–20)
CALCIUM SERPL-MCNC: 10 MG/DL (ref 8.3–10.6)
CHLORIDE BLD-SCNC: 99 MMOL/L (ref 99–110)
CO2: 20 MMOL/L (ref 21–32)
CREAT SERPL-MCNC: 1 MG/DL (ref 0.6–1.2)
EOSINOPHILS ABSOLUTE: 0.1 K/UL (ref 0–0.6)
EOSINOPHILS RELATIVE PERCENT: 0.8 %
GFR AFRICAN AMERICAN: >60
GFR NON-AFRICAN AMERICAN: 52
GLUCOSE BLD-MCNC: 104 MG/DL (ref 70–99)
HCT VFR BLD CALC: 44.2 % (ref 36–48)
HEMOGLOBIN: 14.5 G/DL (ref 12–16)
LYMPHOCYTES ABSOLUTE: 1.5 K/UL (ref 1–5.1)
LYMPHOCYTES RELATIVE PERCENT: 18.2 %
MCH RBC QN AUTO: 31.9 PG (ref 26–34)
MCHC RBC AUTO-ENTMCNC: 32.8 G/DL (ref 31–36)
MCV RBC AUTO: 97.2 FL (ref 80–100)
MONOCYTES ABSOLUTE: 0.4 K/UL (ref 0–1.3)
MONOCYTES RELATIVE PERCENT: 5.3 %
NEUTROPHILS ABSOLUTE: 6.1 K/UL (ref 1.7–7.7)
NEUTROPHILS RELATIVE PERCENT: 74.7 %
PDW BLD-RTO: 12.9 % (ref 12.4–15.4)
PLATELET # BLD: 102 K/UL (ref 135–450)
PLATELET SLIDE REVIEW: ADEQUATE
PMV BLD AUTO: 9.5 FL (ref 5–10.5)
POTASSIUM REFLEX MAGNESIUM: 4.5 MMOL/L (ref 3.5–5.1)
RBC # BLD: 4.55 M/UL (ref 4–5.2)
SLIDE REVIEW: ABNORMAL
SODIUM BLD-SCNC: 132 MMOL/L (ref 136–145)
WBC # BLD: 8.1 K/UL (ref 4–11)

## 2022-07-03 PROCEDURE — 93005 ELECTROCARDIOGRAM TRACING: CPT | Performed by: INTERNAL MEDICINE

## 2022-07-03 PROCEDURE — 2580000003 HC RX 258: Performed by: INTERNAL MEDICINE

## 2022-07-03 PROCEDURE — 70450 CT HEAD/BRAIN W/O DYE: CPT

## 2022-07-03 PROCEDURE — 97535 SELF CARE MNGMENT TRAINING: CPT

## 2022-07-03 PROCEDURE — 6360000002 HC RX W HCPCS: Performed by: INTERNAL MEDICINE

## 2022-07-03 PROCEDURE — 80048 BASIC METABOLIC PNL TOTAL CA: CPT

## 2022-07-03 PROCEDURE — 97162 PT EVAL MOD COMPLEX 30 MIN: CPT

## 2022-07-03 PROCEDURE — 1200000000 HC SEMI PRIVATE

## 2022-07-03 PROCEDURE — 97166 OT EVAL MOD COMPLEX 45 MIN: CPT

## 2022-07-03 PROCEDURE — 85025 COMPLETE CBC W/AUTO DIFF WBC: CPT

## 2022-07-03 PROCEDURE — 94760 N-INVAS EAR/PLS OXIMETRY 1: CPT

## 2022-07-03 PROCEDURE — 6370000000 HC RX 637 (ALT 250 FOR IP): Performed by: INTERNAL MEDICINE

## 2022-07-03 PROCEDURE — 97116 GAIT TRAINING THERAPY: CPT

## 2022-07-03 PROCEDURE — 36415 COLL VENOUS BLD VENIPUNCTURE: CPT

## 2022-07-03 RX ORDER — ACETAMINOPHEN 325 MG/1
650 TABLET ORAL EVERY 6 HOURS PRN
Status: DISCONTINUED | OUTPATIENT
Start: 2022-07-03 | End: 2022-07-04 | Stop reason: HOSPADM

## 2022-07-03 RX ORDER — ACETAMINOPHEN 650 MG/1
650 SUPPOSITORY RECTAL EVERY 6 HOURS PRN
Status: DISCONTINUED | OUTPATIENT
Start: 2022-07-03 | End: 2022-07-04 | Stop reason: HOSPADM

## 2022-07-03 RX ORDER — AMIODARONE HYDROCHLORIDE 200 MG/1
100 TABLET ORAL DAILY
Status: DISCONTINUED | OUTPATIENT
Start: 2022-07-03 | End: 2022-07-04 | Stop reason: HOSPADM

## 2022-07-03 RX ORDER — ONDANSETRON 2 MG/ML
4 INJECTION INTRAMUSCULAR; INTRAVENOUS EVERY 6 HOURS PRN
Status: DISCONTINUED | OUTPATIENT
Start: 2022-07-03 | End: 2022-07-04 | Stop reason: HOSPADM

## 2022-07-03 RX ORDER — PANTOPRAZOLE SODIUM 40 MG/1
40 TABLET, DELAYED RELEASE ORAL
Status: DISCONTINUED | OUTPATIENT
Start: 2022-07-03 | End: 2022-07-04 | Stop reason: HOSPADM

## 2022-07-03 RX ORDER — POLYETHYLENE GLYCOL 3350 17 G/17G
17 POWDER, FOR SOLUTION ORAL DAILY PRN
Status: DISCONTINUED | OUTPATIENT
Start: 2022-07-03 | End: 2022-07-04 | Stop reason: HOSPADM

## 2022-07-03 RX ORDER — SENNA PLUS 8.6 MG/1
2 TABLET ORAL 2 TIMES DAILY
Status: DISCONTINUED | OUTPATIENT
Start: 2022-07-03 | End: 2022-07-04 | Stop reason: HOSPADM

## 2022-07-03 RX ORDER — LANOLIN ALCOHOL/MO/W.PET/CERES
9 CREAM (GRAM) TOPICAL NIGHTLY
Status: DISCONTINUED | OUTPATIENT
Start: 2022-07-03 | End: 2022-07-04 | Stop reason: HOSPADM

## 2022-07-03 RX ORDER — ONDANSETRON 4 MG/1
4 TABLET, ORALLY DISINTEGRATING ORAL EVERY 8 HOURS PRN
Status: DISCONTINUED | OUTPATIENT
Start: 2022-07-03 | End: 2022-07-04 | Stop reason: HOSPADM

## 2022-07-03 RX ORDER — SODIUM CHLORIDE 0.9 % (FLUSH) 0.9 %
5-40 SYRINGE (ML) INJECTION EVERY 12 HOURS SCHEDULED
Status: DISCONTINUED | OUTPATIENT
Start: 2022-07-03 | End: 2022-07-04 | Stop reason: HOSPADM

## 2022-07-03 RX ORDER — SODIUM CHLORIDE 9 MG/ML
INJECTION, SOLUTION INTRAVENOUS PRN
Status: DISCONTINUED | OUTPATIENT
Start: 2022-07-03 | End: 2022-07-04 | Stop reason: HOSPADM

## 2022-07-03 RX ORDER — HYDRALAZINE HYDROCHLORIDE 20 MG/ML
10 INJECTION INTRAMUSCULAR; INTRAVENOUS ONCE
Status: COMPLETED | OUTPATIENT
Start: 2022-07-03 | End: 2022-07-03

## 2022-07-03 RX ORDER — ACETAMINOPHEN 500 MG
1000 TABLET ORAL 2 TIMES DAILY
Status: DISCONTINUED | OUTPATIENT
Start: 2022-07-03 | End: 2022-07-04 | Stop reason: HOSPADM

## 2022-07-03 RX ORDER — SODIUM CHLORIDE 0.9 % (FLUSH) 0.9 %
5-40 SYRINGE (ML) INJECTION PRN
Status: DISCONTINUED | OUTPATIENT
Start: 2022-07-03 | End: 2022-07-04 | Stop reason: HOSPADM

## 2022-07-03 RX ORDER — LEVOTHYROXINE SODIUM 0.07 MG/1
75 TABLET ORAL DAILY
Status: DISCONTINUED | OUTPATIENT
Start: 2022-07-03 | End: 2022-07-04 | Stop reason: HOSPADM

## 2022-07-03 RX ORDER — TRAZODONE HYDROCHLORIDE 50 MG/1
50 TABLET ORAL NIGHTLY
Status: DISCONTINUED | OUTPATIENT
Start: 2022-07-03 | End: 2022-07-04 | Stop reason: HOSPADM

## 2022-07-03 RX ORDER — HYDROCHLOROTHIAZIDE 25 MG/1
12.5 TABLET ORAL DAILY
Status: DISCONTINUED | OUTPATIENT
Start: 2022-07-03 | End: 2022-07-04 | Stop reason: HOSPADM

## 2022-07-03 RX ORDER — OMEPRAZOLE 10 MG/1
20 CAPSULE, DELAYED RELEASE ORAL
Status: DISCONTINUED | OUTPATIENT
Start: 2022-07-03 | End: 2022-07-03 | Stop reason: CLARIF

## 2022-07-03 RX ADMIN — PANTOPRAZOLE SODIUM 40 MG: 40 TABLET, DELAYED RELEASE ORAL at 09:04

## 2022-07-03 RX ADMIN — MELATONIN TAB 3 MG 9 MG: 3 TAB at 20:43

## 2022-07-03 RX ADMIN — ACETAMINOPHEN 1000 MG: 500 TABLET ORAL at 01:59

## 2022-07-03 RX ADMIN — MELATONIN TAB 3 MG 9 MG: 3 TAB at 01:59

## 2022-07-03 RX ADMIN — HYDRALAZINE HYDROCHLORIDE 10 MG: 20 INJECTION INTRAMUSCULAR; INTRAVENOUS at 09:29

## 2022-07-03 RX ADMIN — ACETAMINOPHEN 1000 MG: 500 TABLET ORAL at 09:00

## 2022-07-03 RX ADMIN — SENNOSIDES 17.2 MG: 8.6 TABLET, FILM COATED ORAL at 02:00

## 2022-07-03 RX ADMIN — TRAZODONE HYDROCHLORIDE 50 MG: 50 TABLET ORAL at 02:00

## 2022-07-03 RX ADMIN — SENNOSIDES 17.2 MG: 8.6 TABLET, FILM COATED ORAL at 08:59

## 2022-07-03 RX ADMIN — TRAZODONE HYDROCHLORIDE 50 MG: 50 TABLET ORAL at 20:42

## 2022-07-03 RX ADMIN — AMIODARONE HYDROCHLORIDE 100 MG: 200 TABLET ORAL at 09:01

## 2022-07-03 RX ADMIN — LEVOTHYROXINE SODIUM 75 MCG: 0.07 TABLET ORAL at 09:03

## 2022-07-03 RX ADMIN — SENNOSIDES 17.2 MG: 8.6 TABLET, FILM COATED ORAL at 20:42

## 2022-07-03 RX ADMIN — ACETAMINOPHEN 1000 MG: 500 TABLET ORAL at 20:42

## 2022-07-03 RX ADMIN — HYDROCHLOROTHIAZIDE 12.5 MG: 25 TABLET ORAL at 09:01

## 2022-07-03 ASSESSMENT — PAIN SCALES - GENERAL
PAINLEVEL_OUTOF10: 8
PAINLEVEL_OUTOF10: 8
PAINLEVEL_OUTOF10: 0

## 2022-07-03 ASSESSMENT — PAIN DESCRIPTION - FREQUENCY
FREQUENCY: CONTINUOUS
FREQUENCY: CONTINUOUS

## 2022-07-03 ASSESSMENT — PAIN DESCRIPTION - ONSET
ONSET: ON-GOING
ONSET: ON-GOING

## 2022-07-03 ASSESSMENT — PAIN DESCRIPTION - LOCATION
LOCATION: HEAD
LOCATION: HEAD

## 2022-07-03 ASSESSMENT — PAIN DESCRIPTION - PAIN TYPE
TYPE: OTHER (COMMENT)
TYPE: OTHER (COMMENT)

## 2022-07-03 ASSESSMENT — PAIN DESCRIPTION - ORIENTATION
ORIENTATION: POSTERIOR
ORIENTATION: POSTERIOR

## 2022-07-03 NOTE — H&P
Hospital Medicine History & Physical      PCP: Arturo Campos MD    Date of Admission: 7/2/2022    Date of Service: Pt seen/examined on 7/3/2022  and Admitted to Inpatient with expected LOS greater than two midnights due to medical therapy. Chief Complaint:    Chief Complaint   Patient presents with   Hussein Fall     Per squad Pt had fall with her walker. Pt states she doesn't know if she LOC or was weak prior to fall. Pt [place] on right side. Pt has Hx: Falls, A-fib, Subdural hematoma. No blood thinner noted on chart.  Laceration     To back of head with pain to that area. Pain locations are also noted as neck and coccyx area. History Of Present Illness: The patient is a 80 y.o. female with history of paroxysmal atrial fibrillation, history of dementia, hyperlipidemia, hypothyroidism who is resident [facility] in [facility] and had a mechanical fall tonight sustaining small laceration to the back of the head without loss of consciousness. Patient was sent in for CT imaging. CT head showed small amount of acute intraventricular hemorrhage within the posterior horn of the left lateral ventricle. Neurosurgery at [facility] was contacted with no plans for direct neurosurgical intervention indicated or recommended and no anticipated surgical intervention either. Recommended avoiding anticoagulants and to discontinue antiplatelet therapy. To repeat CT in the morning, if stable to minimally change then no further investigation needed at that time. At the time of my evaluation, patient alert and oriented in no distress. Expresses headache.   Cervical spine CT with no acute pathology    Past Medical History:        Diagnosis Date    Ataxia     Atrial fibrillation ([facility]     Depression 05/04/2011    Dermatitis 07/13/2012    Elevated blood sugar 244.9    Fatigue 02/252/2010    Glaucoma     Glaucoma     Hyperglycemia     Hyperlipidemia     Hypertension     Hypothyroidism     Impaired fasting blood sugar 05/04/2011    Impaired fasting glucose     Peripheral vascular disease (HCC)     Pulmonary hypertension, primary ([facility] 04/19/2010    Thrombocytopenia ([facility]     Vitamin D deficiency     Vitamin D deficiency 07/06/2010       Past Surgical History:        Procedure Laterality Date    APPENDECTOMY      OTHER SURGICAL HISTORY  07/05/2018    RIGHT hip gamma nail     OVARY SURGERY      TONSILLECTOMY         Medications Prior to Admission:    Prior to Admission medications    Medication Sig Start Date End Date Taking?  Authorizing Provider   hydroCHLOROthiazide (MICROZIDE) 12.5 MG capsule Take 12.5 mg by mouth daily    Historical Provider, MD   traZODone (DESYREL) 50 MG tablet Take 0.5 tablets by mouth nightly Take 1/2 tab at bedtime  Patient taking differently: Take 50 mg by mouth nightly  2/5/21   Yelena Fernandez MD   melatonin 3 MG TABS tablet Take 9 mg by mouth nightly     Historical Provider, MD   omeprazole (PRILOSEC) 20 MG delayed release capsule Take 20 mg by mouth every morning (before breakfast)     Historical Provider, MD   potassium chloride (MICRO-K) 10 MEQ extended release capsule Take 10 mEq by mouth daily    Historical Provider, MD   sodium chloride (OCEAN, BABY AYR) 0.65 % nasal spray 1 spray by Nasal route as needed for Congestion  Patient not taking: Reported on 6/19/2022    Historical Provider, MD   clopidogrel (PLAVIX) 75 MG tablet Take 1 tablet by mouth daily 5/12/20   Yelena Fernandez MD   acetaminophen (TYLENOL) 325 MG tablet Take 1,000 mg by mouth 2 times daily     Historical Provider, MD   diclofenac sodium (VOLTAREN) 1 % GEL Apply 2 g topically 2 times daily as needed for Pain    Historical Provider, MD   amiodarone (CORDARONE) 200 MG tablet Take 0.5 tablets by mouth daily 10/4/18   Tristin Morfin MD   Lift Chair MISC by Does not apply route 7/20/18   Bryson Goncalves MD   senna (SENOKOT) 8.6 MG tablet Take 2 tablets by mouth 2 times daily     Historical Provider, MD extremities, bilaterally. Cranial nerves: II-XII intact, grossly non-focal.  Mental status: Alert, oriented, thought content appropriate. CBC   Recent Labs     07/02/22 2118   WBC 6.9   HGB 13.5   HCT 40.6         RENAL  Recent Labs     07/02/22 2118      K 4.2      CO2 25   BUN 17   CREATININE 1.1         Active Hospital Problems    Diagnosis Date Noted    Intraventricular hemorrhage ([facility] [I61.5] 07/03/2022     Priority: Medium    Headache [R51.9] 07/03/2022     Priority: Medium    Scalp laceration [S01.01XA] 07/03/2022     Priority: Medium    Fall at nursing home [C77. Joanna Saira, Y92.129] 07/03/2022     Priority: Medium    Dementia [facility]) [F03.90] 06/20/2022     Priority: Medium    Paroxysmal atrial fibrillation ([facility] [I48.0]     Hypothyroid [E03.9] 02/10/2014    Hyperlipidemia [E78.5]          ASSESSMENT/PLAN:  80 y.o. female with history of paroxysmal atrial fibrillation, history of dementia, hyperlipidemia, hypothyroidism who is resident [facility] in [facility] and had a mechanical fall tonight sustaining small laceration to the back of the head sustaining head injury with intraventricular hemorrhage and associated headache. Plan:  - Continue monitoring neurology overnight  - Repeat CT head in the morning  - Neurosurgery consult  - PT OT evaluations  - Stop Plavix  - Avoid anticoagulants  - Continue however chronic home medications      DVT Prophylaxis: SCD  Diet: General diet  Code Status: [facility]         [facility] Annie Stanley MD    Thank you Pat Quezada MD for the opportunity to be involved in this patient's care. If you have any questions or concerns please feel free to contact me at [phone].

## 2022-07-03 NOTE — PROGRESS NOTES
Glaucoma, Glaucoma, Hyperglycemia, Hyperlipidemia, Hypertension, Hypothyroidism, Impaired fasting blood sugar, Impaired fasting glucose, Peripheral vascular disease ([address] Pulmonary hypertension, primary ([address] Thrombocytopenia ([address] Vitamin D deficiency, and Vitamin D deficiency. Past Surgical History:  has a past surgical history that includes Appendectomy; Ovary surgery; Tonsillectomy; and other surgical history (07/05/2018). Discharge Recommendations: Will Francis scored a 15/24 on the AM-PAC ADL Inpatient form. Current research shows that an AM-PAC score of 17 or less is typically not associated with a discharge to the patient's home setting. Based on the patient's AM-PAC score and their current ADL deficits, it is recommended that the patient have 3-5 sessions per week of Occupational Therapy at d/c to increase the patient's independence. Please see assessment section for further patient specific details. If patient discharges prior to next session this note will serve as a discharge summary. Please see below for the latest assessment towards goals.        DME Required For Discharge: DME to be determined at next level of care    Precautions/Restrictions: high fall risk  Weight Bearing Restrictions: no restrictions  [] Right Upper Extremity  [] Left Upper Extremity [] Right Lower Extremity  [] Left Lower Extremity     Required Braces/Orthotics: no braces required   [] Right  [] Left  Positional Restrictions:no positional restrictions    Pre-Admission Information   Lives With: spouse                  Type of Home: long term care facility [facility] assisted living   Home Layout: one level  Home Access: level entry  Jinnie Mealing in shower showers 2x a week with aide   Toilet Height: elevated height  Home Equipment: rollator - 4 wheeled walker transport chair, lift chair   Transfer Assistance: Independent without use of device  Ambulation Assistance:modified independent with use of rollator   ADL Assistance: requires assistance with bathing, requires assistance with dressing  IADL Assistance: facility provides meals (usually a dining freeman but due to [facility] [employer] has been closed)  Active :        []??? Yes                 [x]? ?? No (son provides transportation)   Hand Dominance: []??? Left                 []??? Right  Hobbies:   Recent Falls: 1 recent fall. Social hx confirmed with son present in room due to pts cognitive status and [place]   Had admission in June - had short SNF stay and has been back in AL x 2 days prior to falling and getting re-admitted  Examination   Vision:   Vision Corrective Device: poor vision due to glaucoma and macular degeneration  Hearing:   hard of hearing  Perception:   WFL  Observation:   General Observation:  shoulder forward, flexed head  Posture:   Fair, patient c/o weakness  Sensation:   denies numbness and tingling  Proprioception:    WFL  Tone:   Normotonic  Coordination Testing:   WFL    ROM:   (B) UE AROM WFL  Strength:   (B) UE strength grossly WFL    Decision Making: medium complexity  Clinical Presentation: stable      Subjective  General: Patient supine in bed, hard of hearing, mild confusion. Patient's son stated patient only back in AL from SNF since Thursday (home 2 days) before had fall. Pain: 0/10, did state back of head is sore when turns head on pillow (has 1 staple from laceration from fall)  Pain Interventions: not applicable        Activities of Daily Living  Basic Activities of Daily Living  Feeding: setup assistance  Feeding Comments: setup of breakfast per son  Dressing Comments: able to doff socks, max to don socks sitting EOB  Toileting: unable to assess secondary to declined, son reports just toileted . Comment: did not perform toileting. Equipment: none  General Comments: declined ADLs, did agree to walk and get up into chair  Instrumental Activities of Daily Living  No IADL completed on this date.     Functional Mobility  Bed Mobility  Supine to Sit: stand by assistance  Comments:  Transfers  Bed / Chair Transfer: minimal assistance. Mobility technique: ambulating. Equipment utilized: rollator (5UDV)  Comments: Verbal cues to lock brakes, 1 loss of balance with minimal assist to correct. Functional Mobility:  Sitting Balance: stand by assistance. Standing Balance: minimal assistance. Functional Mobility: rollator (8HZK). minimal assistance. Activity: ambulate x 20 feet with CGA/min assist with 4YG    Other Therapeutic Interventions    Functional Outcomes  AM-PAC Inpatient Daily Activity Raw Score: 15    Cognition  Overall Cognitive Status: Impaired  Arousal/Alterness: appropriate responses to stimuli  Memory: decreased recall of recent events, decreased short term memory  Safety Judgement: decreased awareness of need for assistance, decreased awareness of need for safety  Insights: decreased awareness of deficits  Comments:   Orientation:    alert and oriented x 4  Command Following:   [facility]     Education  Barriers To Learning: cognition  Patient Education: patient educated on goals, OT role and benefits, family education, discharge recommendations  Learning Assessment:  patient verbalizes understanding, would benefit from continued reinforcement    Assessment  Activity Tolerance: Fair  Impairments Requiring Therapeutic Intervention: decreased functional mobility, decreased ADL status, decreased cognition, decreased endurance, decreased posture  Prognosis: good  Clinical Assessment: Patient's son reports patient was in SNF unit until Thursday. Stated fell with 4WW 2 days later. Patient stated is weaker than normal. Recommend return to SNF instead of AL at this time due to needing increased assist with ADLs and mobility.    Safety Interventions: patient left in chair, chair alarm in place, patient at risk for falls, nurse notified and family/caregiver present    Plan  Frequency: 3-5 x/per week  Current Treatment Recommendations: strengthening, ROM, balance training, functional mobility training, transfer training, endurance training and ADL/self-care training    Goals  Patient Goals: Patient stated feels weak, agreeable to go back to SNF  Short Term Goals:  Time Frame: until discharge  Patient will complete lower body ADL at stand by assistance   Patient will complete toileting at supervision   Patient will complete functional transfers at modified independent   Patient will complete functional mobility at modified independent   Patient will increase [facility] ADL score = to or > than 18/24    Therapy Session Time     Individual Group Co-treatment   Time In     1054   Time Out     1120   Minutes     24        Timed Code Treatment Minutes: 9     Total Treatment Minutes:  24       Electronically Signed By: JORDON Ledesma/ABAD [address]

## 2022-07-03 NOTE — ED PROVIDER NOTES
[facility]    Physician Attestation    Pt Name: Maxwell Tomlin  MRN: [id number]  Kipgfkay 10/29/1929  Date of evaluation: 7/2/22        Physician Note:    I havepersonally performed and/or participated in the history, exam and medical decision making and agree with all pertinent clinical information. I have also reviewed and agree with the past medical, family and social historyunless otherwise noted. I have personally performed a face to face diagnostic evaluation onthis patient. I have reviewed the mid-levels findings and agree. History: Patient fell with her walker landing on the ground hitting her occiput c-collar is in place patient does have a laceration to the occiput      REVIEW OF SYSTEMS    Constitutional:  Denies fever, chills, or weakness   Eyes:  Denies vision changes  HENT:  Denies sore throat or neck pain   Respiratory:  Denies cough or shortness of breath   Cardiovascular:  Denies chest pain  GI:  Denies abdominal pain, nausea, vomiting, or diarrhea   Musculoskeletal:  Denies back pain   Skin: no rash or vesicles   Neurologic:  no headache weakness focal    Lymphatic:  no swollen  nodes   Psychiatric: no si or hs thoughts     All systems negative except as marked. General Appearance:  Alert, cooperative, no distress, appears stated age. Head:  Normocephalic, without obvious abnormality, occiput laceration   Eyes:  conjunctiva/corneas clear, EOM's intact. Sclera anicteric. ENT: Mucous membranes moist.   Neck: Supple, symmetrical, trachea midline, no adenopathy. No jugular venous distention. Lungs:   No Respiratory Distress. no rales  rhonchi rub   Chest Wall:  Nontender  no deformity   Heart:  Rsr no murmer gallop    Abdomen:   Soft nontender no organomegally    Extremities:  Full range of motion. no deformity   Pulses: Equal  upper and lower    Skin:  No rashes or lesions to exposed skin.    Neurologic: Alert and oriented X 3. Motor grossly normal.  Speech clear. Is a DNR CC it was discussed with the neurosurgeon she would not be a surgical candidate the hospitalist will admit the patient we will rescan her she will stay here      1. Intraventricular hemorrhage ([address]    2. Laceration of scalp, initial encounter          DISPOSITION/PLAN  PATIENT REFERRED TO:  No follow-up provider specified. DISCHARGE MEDICATIONS:  New Prescriptions    No medications on file         Is this patient to be included in the SEP-1 Core Measure due to severe sepsis or septic shock? No   Exclusion criteria - the patient is NOT to be included for SEP-1 Core Measure due to:   Infection is not suspected      MD Alida Gaines MD  07/03/22 0206

## 2022-07-03 NOTE — PROGRESS NOTES
0700- staff placed IV in pt left A/C with blood return noted. Pt resisting and yelling during placement. Pt is confused and [place] at baseline. 0900- pt back from repeat CT. Son now at bedside. Clarified that amiodarone is to be given with current HR with Dr Ruddy Cooley. Telephone order taken for IV hydralazine 0910. PO meds taken easily with water. no overt s/s of aspiration noted. Assessment completed at this time. 4333 IV hydralazine given. Medication pushed ok but pt c/o pain at IV site during flush. 1005- page placed to hospitalist to inform of repeat CT results. Per Dr Ruddy Cooley ok not to restart IV until he can round on pt.

## 2022-07-03 NOTE — PROGRESS NOTES
Physical Therapy    [facility]   Phone: [phone]    Physical Therapy    [x] Initial Evaluation            [] Daily Treatment Note         [] Discharge Summary      Patient: Sp Francisco   : 10/29/1929   MRN: [id number]   Date of Service:  7/3/2022  Admitting Diagnosis: Intraventricular hemorrhage ([address]  Current Admission Summary: Sp Francisco is a 80 y.o. female who presents to the emergency department after falling with her walker and lacerating the occipital scalp. Patient is uncertain how she fell. Laceration is present but bleeding controlled. Head CT:Small amount of acute intraventricular blood within the posterior horn of the left lateral ventricle. Seen by neurosurgery: No direct neurosurgical intervention indicated or recommended. Past Medical History:  has a past medical history of Ataxia, Atrial fibrillation ([address] Depression, Dermatitis, Elevated blood sugar, Fatigue, Glaucoma, Glaucoma, Hyperglycemia, Hyperlipidemia, Hypertension, Hypothyroidism, Impaired fasting blood sugar, Impaired fasting glucose, Peripheral vascular disease ([address] Pulmonary hypertension, primary ([address] Thrombocytopenia ([address] Vitamin D deficiency, and Vitamin D deficiency. Past Surgical History:  has a past surgical history that includes Appendectomy; Ovary surgery; Tonsillectomy; and other surgical history (2018). Discharge Recommendations: Sp Francisco scored a 14/24 on the AM-PAC short mobility form. Current research shows that an AM-PAC score of 17 or less is typically not associated with a discharge to the patient's home setting. Based on the patient's AM-PAC score and their current functional mobility deficits, it is recommended that the patient have 3-5 sessions per week of Physical Therapy at d/c to increase the patient's independence. Please see assessment section for further patient specific details.   If patient discharges prior to next session this note will serve as a discharge summary. Please see below for the latest assessment towards goals. DME Required For Discharge: no DME required at discharge  Precautions/Restrictions: high fall risk  Weight Bearing Restrictions: no restrictions  [] Right Upper Extremity  [] Left Upper Extremity [] Right Lower Extremity  [] Left Lower Extremity     Required Braces/Orthotics: no braces required   [] Right  [] Left  Positional Restrictions:no positional restrictions    Pre-Admission Information   Lives With: spouse                  Type of Home: long term care facility [facility] assisted living   Home Layout: one level  Home Access: level entry  Gerald Brizuela in shower showers 2x a week with aide   Toilet Height: elevated height  Home Equipment: rollator - 4 wheeled walker transport chair, lift chair   Transfer Assistance: Independent without use of device  Ambulation Assistance:modified independent with use of rollator   ADL Assistance: requires assistance with bathing, requires assistance with dressing  IADL Assistance: facility provides meals (usually a dining freeman but due to [employer] has been closed)  Active :        []? ? Yes                 [x]? ? No (son provides transportation)   Hand Dominance: []?? Left                 []? ? Right  Hobbies:   Recent Falls: 1 recent fall. Social hx confirmed with son present in room due to pts cognitive status and [place]   Had admission in June - had short SNF stay and has been back in AL x 2 days prior to falling and getting re-admitted.     Examination   Vision:   Vision Gross Assessment: Impaired , macular degeneration and glaucoma - son states patient nearly blind  Hearing:   hard of hearing, left hearing aid, right hearing aid  - doesn't wear    Sensation:   WFL  ROM:   (B) LE AROM WFL  Strength:   Formal MMT held secondary to patient significantly [place], difficultly with following directions, grossly at least 3+/5 bilaterally  Decision Making: medium complexity  Clinical Presentation: evolving      Subjective  General: Supine in bed upon arrival with alarm on. Agreeable to therapy. States she feels very weak.   Pain: 0/10  Pain Interventions: not applicable       Functional Mobility  Bed Mobility  Supine to Sit: stand by assistance  Comments:  Transfers  Sit to stand transfer: contact guard assistance  Stand to sit transfer: contact guard assistance  Comments: from bed, increased time to achieve upright posture, cues for walker safety (pulling up on rollator without locking brakes)  Ambulation  Surface:level surface  Assistive Device: rollator (4JAD)  Assistance: minimal assistance  Distance: 20'  Gait Mechanics: forward flexed posture, decreased loida, step to pattern leading with RLE, 1 minor LOB with min A to correct  Comments:  Reporting significant fatigue and LE weakness with minimal ambulation  Balance  Static Sitting Balance: fair (+): maintains balance at SBA/supervision without use of UE support  Dynamic Sitting Balance: fair: maintains balance at CGA without use of UE support  Static Standing Balance: fair (-): maintains balance at CGA with use of UE support  Dynamic Standing Balance: poor (+): requires min (A) to maintain balance  Comments: LOB to L with attempt to don socks - CGA/min A provided    Other Therapeutic Interventions    Functional Outcomes  AM-PAC Inpatient Mobility Raw Score : 14              Cognition  Overall Cognitive Status: Impaired  Arousal/Alterness: appropriate responses to stimuli  Following Commands: follows one step commands with repetition, follows one step commands with increased time  Attention Span: attends with cues to redirect  Memory: decreased recall of biographical information, decreased short term memory, decreased long term memory  Safety Judgement: decreased awareness of need for safety  Problem Solving: assistance required to generate solutions, assistance required to implement solutions, assistance required to identify errors made, assistance required to correct errors made  Insights: decreased awareness of deficits  Orientation:    alert and oriented x 4  Command Following:   [facility]    Education  Barriers To Learning: cognition and hearing  Patient Education: patient educated on goals, PT role and benefits, plan of care, general safety, functional mobility training, transfer training, discharge recommendations  Learning Assessment:  patient will require reinforcement due to cognitive deficits    Assessment  Activity Tolerance: limited by fatigue  Impairments Requiring Therapeutic Intervention: decreased functional mobility, decreased strength, decreased safety awareness, decreased endurance, decreased balance  Prognosis: good  Clinical Assessment: Patient not at baseline function and would benefit from skilled PT to address above deficits and facilitate return to baseline function. Patient at increased risk of falls and struggles to ambulate functional distances this date. Recommend continues skilled PT.     Safety Interventions: patient left in chair, chair alarm in place, call light within reach, patient at risk for falls and nurse notified    Plan  Frequency: 3-5 x/per week  Current Treatment Recommendations: strengthening, balance training, functional mobility training, transfer training, gait training and endurance training    Goals  Patient Goals: did not state   Short Term Goals:  Time Frame: discharge  Patient will complete bed mobility at supervision   Patient will complete transfers at supervision   Patient will ambulate 50 ft with use of rollator (4WRW) at contact guard assistance    Therapy Session Time      Individual Group Co-treatment   Time In     1054   Time Out     1120   Minutes     26     Timed Code Treatment Minutes:  11 Minutes  Total Treatment Minutes:  26       Electronically Signed By: JANICE Hector, Rd Bland PT DPT [id number]

## 2022-07-03 NOTE — PROGRESS NOTES
Up to bedside commode multiple times for voids today SBA. No additional bloody drainage noted from staple at back of head. Tolerating diet. Visitor remains at bedside. Will continue to monitor.

## 2022-07-03 NOTE — PLAN OF CARE
79 yo NH patient fell today and brought to ED. Head CT:  Small amount of acute intraventricular blood within the posterior horn of the  left lateral ventricle.     Atrophy and white matter disease are re-identified. A/P:  -No direct neurosurgical intervention indicated or recommended.  -I do not anticipate any indication for surgical intervention.  -Avoid all anticoagulants x 2 weeks (DC Plavix)  -Consider desmopressin and platelet transfusion to reverse plavix per standard medical protocol.  -supportive care plan  -repeat head CT in AM, if stable to minimal change, then no further investigation needed.

## 2022-07-03 NOTE — ED PROVIDER NOTES
activity change, chills and fever. Respiratory: Negative for chest tightness and shortness of breath. Cardiovascular: Negative for chest pain. Gastrointestinal: Negative for abdominal pain, diarrhea, nausea and vomiting. Genitourinary: Negative for dysuria. Musculoskeletal: Positive for neck pain. Skin: Positive for wound. All other systems reviewed and are negative. Positives and Pertinent negatives as per HPI. Except as noted above in the ROS, all other systems were reviewed and negative.        PAST MEDICAL HISTORY     Past Medical History:   Diagnosis Date    Ataxia     Atrial fibrillation ([facility]     Depression 05/04/2011    Dermatitis 07/13/2012    Elevated blood sugar 244.9    Fatigue 02/252/2010    Glaucoma     Glaucoma     Hyperglycemia     Hyperlipidemia     Hypertension     Hypothyroidism     Impaired fasting blood sugar 05/04/2011    Impaired fasting glucose     Peripheral vascular disease (HCC)     Pulmonary hypertension, primary ([facility] [address] [facility] 04/19/2010    Thrombocytopenia ([facility] [address] [facility]     Vitamin D deficiency     Vitamin D deficiency 07/06/2010         SURGICAL HISTORY     Past Surgical History:   Procedure Laterality Date    APPENDECTOMY      OTHER SURGICAL HISTORY  07/05/2018    RIGHT hip gamma nail     OVARY SURGERY      TONSILLECTOMY           CURRENTMEDICATIONS       Previous Medications    ACETAMINOPHEN (TYLENOL) 325 MG TABLET    Take 1,000 mg by mouth 2 times daily     AMIODARONE (CORDARONE) 200 MG TABLET    Take 0.5 tablets by mouth daily    CHOLECALCIFEROL (VITAMIN D3) 5000 UNITS TABS    Take 1 tablet by mouth every other day     CLOPIDOGREL (PLAVIX) 75 MG TABLET    Take 1 tablet by mouth daily    DICLOFENAC SODIUM (VOLTAREN) 1 % GEL    Apply 2 g topically 2 times daily as needed for Pain    HYDROCHLOROTHIAZIDE (MICROZIDE) 12.5 MG CAPSULE    Take 12.5 mg by mouth daily    LEVOTHYROXINE (SYNTHROID) 75 MCG TABLET    TAKE 1 TABLET BY MOUTH EVERY DAY    LIFT CHAIR [facility] by Does not apply route    MELATONIN 3 MG TABS TABLET    Take 9 mg by mouth nightly     OMEPRAZOLE (PRILOSEC) 20 MG DELAYED RELEASE CAPSULE    Take 20 mg by mouth every morning (before breakfast)     POTASSIUM CHLORIDE (MICRO-K) 10 MEQ EXTENDED RELEASE CAPSULE    Take 10 mEq by mouth daily    SENNA (SENOKOT) 8.6 MG TABLET    Take 2 tablets by mouth 2 times daily     SODIUM CHLORIDE (OCEAN, BABY AYR) 0.65 % NASAL SPRAY    1 spray by Nasal route as needed for Congestion    TRAZODONE (DESYREL) 50 MG TABLET    Take 0.5 tablets by mouth nightly Take 1/2 tab at bedtime         ALLERGIES     Shellfish-derived products, Shrimp flavor, Timolol, Tramadol, Sulfa antibiotics, and Sulfa antibiotics    FAMILYHISTORY       Family History   Problem Relation Age of Onset    Other Mother         dementia    Heart Disease Father           SOCIAL HISTORY       Social History     Tobacco Use    Smoking status: Never Smoker    Smokeless tobacco: Never Used   Vaping Use    Vaping Use: Never used   Substance Use Topics    Alcohol use: No     Comment: rare    Drug use: No       SCREENINGS    [place] Coma Scale  Eye Opening: Spontaneous  Best Verbal Response: Oriented  Best Motor Response: Obeys commands  [place] Coma Scale Score: 15        PHYSICAL EXAM    (up to 7 for level 4, 8 or more for level 5)     ED Triage Vitals [07/02/22 2109]   BP Temp Temp Source Heart Rate Resp SpO2 Height Weight   (!) 150/68 97.8 °F (36.6 °C) Oral 60 11 98 % 5' 4\" (1.626 m) 165 lb (74.8 kg)       Physical Exam  Vitals and nursing note reviewed. Constitutional:       Appearance: She is well-developed. She is not diaphoretic. HENT:      Head: Normocephalic. Right Ear: External ear normal.      Left Ear: External ear normal.   Eyes:      General:         Right eye: No discharge. Left eye: No discharge. Neck:      Vascular: No JVD. Cardiovascular:      Rate and Rhythm: Normal rate. Pulses: Normal pulses.    Pulmonary:      Effort: Pulmonary effort is normal. No respiratory distress. Abdominal:      Palpations: Abdomen is soft. Musculoskeletal:         General: Normal range of motion. Cervical back: Normal range of motion and neck supple. Skin:     General: Skin is warm and dry. Coloration: Skin is not pale. Neurological:      Mental Status: She is alert and oriented to person, place, and time. Psychiatric:         Behavior: Behavior normal.         DIAGNOSTIC RESULTS   LABS:    Labs Reviewed   BASIC METABOLIC PANEL - Abnormal; Notable for the following components:       Result Value    GFR Non- 46 (*)     GFR  56 (*)     All other components within normal limits   CBC WITH AUTO DIFFERENTIAL       When ordered only abnormal lab results are displayed. All other labs were within normal range or not returned as of this dictation. EKG: When ordered, EKG's are interpreted by the Emergency Department Physician in the absence of a cardiologist.  Please see their note for interpretation of EKG. RADIOLOGY:   Non-plain film images such as CT, Ultrasound and MRI are read by the radiologist. Plain radiographic images are visualized and preliminarily interpreted by the ED Provider with the below findings:        Interpretation per the Radiologist below, if available at the time of this note:    CT HEAD WO CONTRAST   Final Result   Small amount of acute intraventricular blood within the posterior horn of the   left lateral ventricle. Atrophy and white matter disease are re-identified. This report was discussed with the nurse practitioner Christiana Murrieta at   10:15 p.m. on 07/02/2022. CT CERVICAL SPINE WO CONTRAST   Final Result   No acute abnormality of the cervical spine. CT HEAD WO CONTRAST    (Results Pending)     No results found.         PROCEDURES   Unless otherwise noted below, none     Lac Repair    Date/Time: 7/3/2022 12:30 AM  Performed by: MELANIE Carter - CNP  Authorized by: Elva Glass MD     Consent:     Consent obtained:  Verbal    Consent given by:  Patient    Risks discussed:  Infection and pain  Anesthesia (see MAR for exact dosages): Anesthesia method:  None  Laceration details:     Location:  Scalp    Scalp location:  Occipital    Length (cm):  0.5  Repair type:     Repair type:  Simple  Pre-procedure details:     Preparation:  Patient was prepped and draped in usual sterile fashion  Exploration:     Wound exploration: wound explored through full range of motion      Contaminated: no    Treatment:     Area cleansed with:  Hibiclens    Amount of cleaning:  Standard    Irrigation solution:  Sterile saline  Skin repair:     Repair method:  Staples    Number of staples:  1  Approximation:     Approximation:  Close  Post-procedure details:     Dressing:  Open (no dressing)    Patient tolerance of procedure: Tolerated well, no immediate complications        CRITICAL CARE TIME       CONSULTS:  IP CONSULT TO NEUROSURGERY      EMERGENCY DEPARTMENT COURSE and DIFFERENTIAL DIAGNOSIS/MDM:   Vitals:    Vitals:    07/02/22 2330 07/02/22 2345 07/03/22 0000 07/03/22 0015   BP: (!) 154/60 (!) 155/69 (!) 135/54 (!) 134/53   Pulse: 59 59 56 55   Resp: 14 18 15 16   Temp:       TempSrc:       SpO2: 98% 99% 98% 97%   Weight:       Height:           Patient was given the following medications:  Medications   lidocaine PF 1 % injection (has no administration in time range)   acetaminophen (TYLENOL) tablet 1,000 mg (1,000 mg Oral Given 7/2/22 6532)         Is this patient to be included in the SEP-1 Core Measure due to severe sepsis or septic shock? No   Exclusion criteria - the patient is NOT to be included for SEP-1 Core Measure due to:   Infection is not suspected    Briefly, this is a 51-year-old female with medical history listed including atrial fibrillation with RVR, bradycardia, dementia, hyperlipidemia, hypertension, syncope and collapse the presents to the emergency department today following a fall. She did fall to the right side with her walker, causing a scalp laceration. Rigid neck collar was placed by nursing upon patient's arrival for complaint of neck pain. CT CERVICAL SPINE WO CONTRAST (Final result)  Result time 07/02/22 22:10:47  Final result by Thomas Agarwal MD (07/02/22 22:10:47)                Impression:    No acute abnormality of the cervical spine. C-collar was removed. CT HEAD WO CONTRAST (Final result)  Result time 07/02/22 22:16:09  Final result by Starla Cervantes MD (07/02/22 22:16:09)                Impression:    Small amount of acute intraventricular blood within the posterior horn of the   left lateral ventricle. Atrophy and white matter disease are re-identified. This report was discussed with the nurse practitioner Hoang Santizo at   10:15 p.m. on 07/02/2022. I did call neurosurgery at [facility] initially agreeable to transfer patient overnight for observation and repeat CT in the morning. However, after speaking with the patient's son, Cynthia Rock, he did report that the patient is a DNR CC. I did clarify this with patient. I did then call neurosurgery back, he is agreeable that the patient can remain at [facility] with observation and repeat CT in the morning. Hospitalist agreeable to admit patient after neurosurgery did place care note. I did call patient's son back and updated him regarding patient staying at [facility] with plan to repeat head CT in the morning. FINAL IMPRESSION      1. Intraventricular hemorrhage ([address]    2. Laceration of scalp, initial encounter          DISPOSITION/PLAN   DISPOSITION Admitted 07/03/2022 12:00:35 AM      PATIENT REFERRED TO:  No follow-up provider specified.     DISCHARGE MEDICATIONS:  New Prescriptions    No medications on file       DISCONTINUED MEDICATIONS:  Discontinued Medications    No medications on file (Please note that portions of this note were completed with a voice recognition program.  Efforts were made to edit the dictations but occasionally words are mis-transcribed.)    MELANIE Boles CNP (electronically signed)           MELANIE Boles CNP  07/03/22 0030

## 2022-07-03 NOTE — DISCHARGE INSTR - COC
Continuity of Care Form    Patient Name: Reena May   :  10/29/1929  MRN:  [id number]    Admit date:  2022  Discharge date:  22    Code Status Order: DNR-CC   Advance Directives:      Admitting Physician:  Destiny Oh MD  PCP: Reid Arias MD    Discharging Nurse: [address] Unit/Room#: 4YR-2580/7813-21  Discharging Unit Phone Number: [phone]    Emergency Contact:   Extended Emergency Contact Information  Primary Emergency Contact: Wilberto Armstrong  Address: [address], [address] [facility],[address] Phone: [phone]  Mobile Phone: [phone]  Relation: Child  Secondary Emergency Contact: Francois Armstrong   [address] Phone: [phone]  Relation: Child    Past Surgical History:  Past Surgical History:   Procedure Laterality Date    APPENDECTOMY      OTHER SURGICAL HISTORY  2018    RIGHT hip gamma nail     OVARY SURGERY      TONSILLECTOMY         Immunization History:   Immunization History   Administered Date(s) Administered    COVID-19, PFIZER PURPLE top, DILUTE for use, (age 15 y+), 30mcg/0.3mL 2021, 2021, 10/22/2021    Influenza Vaccine, unspecified formulation 2014, 2014, 09/10/2015, 09/10/2015    Influenza Virus Vaccine 2013, 11/10/2013, 10/01/2017    PPD Test 2013    Pneumococcal Conjugate 7-valent (Siria Jovany) 11/10/2013    Pneumococcal Polysaccharide ([id number]) 2012, 2012    Zoster Live (Zostavax) 2012, 2012       Active Problems:  Patient Active Problem List   Diagnosis Code    Hypothyroid E03.9    Paroxysmal atrial fibrillation ([facility] [address] I48.0    Syncope and collapse R55    Depression with anxiety F41.8    Hyperlipidemia E78.5    Ataxia R27.0    Atrial fibrillation with rapid ventricular response (HCC) I48.91    Dementia ([facility] [address] F03.90    Hypotension I95.9    Bradycardia R00.1    Intraventricular hemorrhage (HCC) I61.5    Headache R51.9    Scalp laceration S01.01XA    Fall at nursing home [address] [facility], E72.214       Isolation/Infection:   Isolation            No Isolation          Patient Infection Status       Infection Onset Added Last Indicated Last Indicated By Review Planned Expiration Resolved Resolved By    None active    Resolved    COVID-19 (Rule Out) 02/05/21 02/05/21 02/05/21 COVID-19 (Ordered)   02/05/21 Rule-Out Test Resulted            Nurse Assessment:  Last Vital Signs: BP (!) 147/88   Pulse 58   Temp 98.3 °F (36.8 °C) (Oral)   Resp 16   Ht 5' 4\" (1.626 m)   Wt 177 lb 14.4 oz (80.7 kg)   SpO2 97%   BMI 30.54 kg/m²     Last documented pain score (0-10 scale): Pain Level: 0  Last Weight:   Wt Readings from Last 1 Encounters:   07/03/22 177 lb 14.4 oz (80.7 kg)     Mental Status:  disoriented, alert, coherent, and able to concentrate and follow conversation    IV Access:  - None    Nursing Mobility/ADLs:  Walking   Assisted  Transfer  Assisted  Bathing  Assisted  Dressing  Assisted  Toileting  Assisted  Feeding  Independent  Med Admin  Assisted  Med Delivery   whole    Wound Care Documentation and Therapy:  Incision 07/05/18 Hip Right (Active)   Number of days: 2959        Elimination:  Continence: Bowel: Yes  Bladder: Yes  Urinary Catheter: None   Colostomy/Ileostomy/Ileal Conduit: No       Date of Last BM: 7/4/22  No intake or output data in the 24 hours ending 07/03/22 1358  No intake/output data recorded. Safety Concerns:     History of Falls (last 30 days) and At Risk for Falls    Impairments/Disabilities:      None    Nutrition Therapy:  Current Nutrition Therapy:   - Oral Diet:  General    Routes of Feeding: Oral  Liquids: No Restrictions  Daily Fluid Restriction: no  Last Modified Barium Swallow with Video (Video Swallowing Test): not done    Treatments at the Time of Hospital Discharge:   Respiratory Treatments: none  Oxygen Therapy:  is not on home oxygen therapy.   Ventilator:    - No ventilator support    Rehab Therapies: Physical Therapy and Occupational Therapy  Weight Bearing Status/Restrictions: No weight bearing restrictions  Other Medical Equipment (for information only, NOT a DME order):  walker  Other Treatments: none    Patient's personal belongings (please select all that are sent with patient):  None    RN SIGNATURE:  Electronically signed by Holly Bernard on 7/4/22 at 9:14 AM EDT    CASE MANAGEMENT/SOCIAL WORK SECTION    Inpatient Status Date: 7/3/2022    Readmission Risk Assessment Score:  Readmission Risk              Risk of Unplanned Readmission:  12           Discharging to [facility]/ [address] [place] [address] Drive  [address] [facility], [address]  Phone: [phone]  Fax: [phone]    / signature: Electronically signed by LEE ANN Williamson on 7/4/2022 at 8:53 AM      PHYSICIAN SECTION    Prognosis: Good    Condition at Discharge: Stable    Rehab Potential (if transferring to Rehab): Fair    Recommended Labs or Other Treatments After Discharge:   No antiplatelets or anticoagulants for at least 2 weeks    Physician Certification: I certify the above information and transfer of Kyalie Naylor  is necessary for the continuing treatment of the diagnosis listed and that she requires [facility] for greater 30 days.      Update Admission H&P: No change in H&P    PHYSICIAN SIGNATURE:  Electronically signed by Jennifer Hartley MD on 7/3/22 at 1:58 PM EDT

## 2022-07-03 NOTE — PROGRESS NOTES
Hospitalist Progress Note      PCP: Karin Cooper MD    Date of Admission: 7/2/2022    Chief Complaint: [facility] Course:   Admitting history reviewed and confirmed  Currently denies any headache nausea vomiting known visual complaints no weakness  Does not want any aggressive intervention  Repeat CT head noted      Medications:  Reviewed      Exam:    BP (!) 147/88   Pulse 58   Temp 98.3 °F (36.8 °C) (Oral)   Resp 16   Ht 5' 4\" (1.626 m)   Wt 177 lb 14.4 oz (80.7 kg)   SpO2 97%   BMI 30.54 kg/m²     General appearance: No apparent distress, appears stated age and cooperative. HEENT: Pupils equal, round, and reactive to light. Conjunctivae/corneas clear. Neck: Supple, with full range of motion. No jugular venous distention. Trachea midline. Respiratory:  Normal respiratory effort. Clear to auscultation, bilaterally without RALES/WHEEZES/Rhonchi. Cardiovascular: Regular rate and rhythm with normal S1/S2 without MURMURS, rubs or gallops. Abdomen: Soft, non-tender, non-distended with normal bowel sounds. Musculoskeletal: No clubbing, cyanosis or EDEMA bilaterally. Full range of motion without deformity. Skin: Skin color, texture, turgor normal.  No rashes or lesions. Neurologic:  Neurovascularly intact without any focal sensory/motor deficits. Cranial nerves: II-XII intact, grossly non-focal.  Good range of motion in both hips    Labs:   Recent Labs     07/02/22  2118 07/03/22  1210   WBC 6.9 8.1   HGB 13.5 14.5   HCT 40.6 44.2    102*     Recent Labs     07/02/22  2118 07/03/22  1210    132*   K 4.2 4.5    99   CO2 25 20*   BUN 17 16   CREATININE 1.1 1.0   CALCIUM 10.3 10.0     No results for input(s): AST, ALT, BILIDIR, BILITOT, ALKPHOS in the last 72 hours. No results for input(s): INR in the last 72 hours. No results for input(s): Magalis Kaska in the last 72 hours.     Urinalysis:      Lab Results   Component Value Date/Time    NITRU Negative 06/19/2022 09:13 AM    WBCUA 2 06/19/2022 09:13 AM    BACTERIA None Seen 06/19/2022 09:13 AM    RBCUA 2 06/19/2022 09:13 AM    BLOODU Negative 06/19/2022 09:13 AM    SPECGRAV 1.010 06/19/2022 09:13 AM    GLUCOSEU Negative 06/19/2022 09:13 AM       Radiology:  CT HEAD WO CONTRAST   Final Result   1. Small amount of intraventricular hemorrhage in the occipital horn of the   left lateral ventricle is more conspicuous compared to yesterday's exam.  No   other areas of hemorrhage are identified. 2. 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 3. Stable ventriculomegaly dating back to 06/08/2020, likely related to   involutional change. Normal pressure hydrocephalus cannot entirely be   excluded. CT HEAD WO CONTRAST   Final Result   Small amount of acute intraventricular blood within the posterior horn of the   left lateral ventricle. Atrophy and white matter disease are re-identified. This report was discussed with the nurse practitioner Abimbola Bryant at   10:15 p.m. on 07/02/2022. CT CERVICAL SPINE WO CONTRAST   Final Result   No acute abnormality of the cervical spine. Assessment/Plan:    Active Hospital Problems    Diagnosis Date Noted    Intraventricular hemorrhage ([address] [I61.5] 07/03/2022     Priority: Medium    Headache [R51.9] 07/03/2022     Priority: Medium    Scalp laceration [S01.01XA] 07/03/2022     Priority: Medium    Fall at nursing home [X22. Emili Canes, Y92.129] 07/03/2022     Priority: Medium    Dementia ([address] [F03.90] 06/20/2022     Priority: Medium    Paroxysmal atrial fibrillation ([address] [I48.0]     Hypothyroid [E03.9] 02/10/2014    Hyperlipidemia [E78.5]        Acute Medical Issues Being Addressed:    80-year old history of paroxysmal atrial fibrillation dementia hyperlipidemia hypothyroidism admitted to the hospital after a fall    Fall probably mechanical  Laceration to the back of the head    Intraventricular hemorrhage posttraumatic  CT of the head showed intraventricular hemorrhage repeat CT head this morning stable  The patient was seen by neurosurgery  No further intervention    Son at bedside also discussed with the son and the patient they do not want any sort of aggressive intervention basically want her to go back to the hospital  Labs have all been reviewed  Clearly understands not to take any antiplatelets or anticoagulants for 2 weeks  Patient is DNR CC  Will get her to SNF today        DVT Prophylaxis:scd   Diet: ADULT DIET;  Regular  Code Status: DNR-CC      Dispo - once acute medical processes have resolved    Xiomara Narayanan MD

## 2022-07-04 VITALS
HEIGHT: 64 IN | TEMPERATURE: 98.1 F | OXYGEN SATURATION: 98 % | RESPIRATION RATE: 16 BRPM | HEART RATE: 61 BPM | DIASTOLIC BLOOD PRESSURE: 72 MMHG | SYSTOLIC BLOOD PRESSURE: 124 MMHG | BODY MASS INDEX: 30.37 KG/M2 | WEIGHT: 177.9 LBS

## 2022-07-04 LAB
EKG ATRIAL RATE: 61 BPM
EKG DIAGNOSIS: NORMAL
EKG P AXIS: 90 DEGREES
EKG P-R INTERVAL: 148 MS
EKG Q-T INTERVAL: 406 MS
EKG QRS DURATION: 92 MS
EKG QTC CALCULATION (BAZETT): 408 MS
EKG R AXIS: -24 DEGREES
EKG T AXIS: 114 DEGREES
EKG VENTRICULAR RATE: 61 BPM

## 2022-07-04 PROCEDURE — 6370000000 HC RX 637 (ALT 250 FOR IP): Performed by: INTERNAL MEDICINE

## 2022-07-04 PROCEDURE — 93010 ELECTROCARDIOGRAM REPORT: CPT | Performed by: INTERNAL MEDICINE

## 2022-07-04 RX ADMIN — SENNOSIDES 17.2 MG: 8.6 TABLET, FILM COATED ORAL at 08:08

## 2022-07-04 RX ADMIN — ACETAMINOPHEN 1000 MG: 500 TABLET ORAL at 08:07

## 2022-07-04 RX ADMIN — CHOLECALCIFEROL TAB 125 MCG (5000 UNIT) 5000 UNITS: 125 TAB at 08:08

## 2022-07-04 RX ADMIN — HYDROCHLOROTHIAZIDE 12.5 MG: 25 TABLET ORAL at 08:08

## 2022-07-04 RX ADMIN — LEVOTHYROXINE SODIUM 75 MCG: 0.07 TABLET ORAL at 05:26

## 2022-07-04 RX ADMIN — AMIODARONE HYDROCHLORIDE 100 MG: 200 TABLET ORAL at 08:08

## 2022-07-04 RX ADMIN — PANTOPRAZOLE SODIUM 40 MG: 40 TABLET, DELAYED RELEASE ORAL at 05:25

## 2022-07-04 ASSESSMENT — PAIN - FUNCTIONAL ASSESSMENT: PAIN_FUNCTIONAL_ASSESSMENT: ACTIVITIES ARE NOT PREVENTED

## 2022-07-04 ASSESSMENT — PAIN SCALES - GENERAL: PAINLEVEL_OUTOF10: 3

## 2022-07-04 ASSESSMENT — PAIN DESCRIPTION - DESCRIPTORS: DESCRIPTORS: ACHING

## 2022-07-04 ASSESSMENT — PAIN DESCRIPTION - ORIENTATION: ORIENTATION: MID

## 2022-07-04 ASSESSMENT — PAIN DESCRIPTION - LOCATION: LOCATION: HEAD

## 2022-07-04 NOTE — CARE COORDINATION
Discharge Planning:     (CM) called and left a voicemail for [facility]) admissions staff, Radha Sanchez ([phone]), and left a voicemail requesting a callback on bed status for skilled nursing facility (SNF) placement for patient. CM provided callback information. ANUSHKA Guido [facility]  [employer] -   [phone]    Electronically signed by LEE ANN Cunha on 7/4/2022 at 8:37 AM        Update at 0845:  CM received a call from [facility] admissions staff, Alley Regalado, who reported that patient can discharge today to them to their SNF. Discharge Plan:  Patient discharge to: [facility]  [address] [facility], [address]  Phone: [phone]  Fax: [phone]    CM to fax [address] to [phone]  CHRISTOFER Larios Patience to call report to [phone]  Medical transport with [address],  time 1:45pm    Family Vernon Coy - Son) advised of discharge, as well as informed that patient will be receiving SNF services from [facility] and in agreement. LVM for [facility] admissions staff, Alley Regalado, and informed of patient's transportation time today. HENS completed. CM intervention complete. ANUSHKA Guido [facility]  [employer] -   [phone]    Electronically signed by LEE ANN Cunha on 7/4/2022 at 9:11 AM          Update at [phone]:  CM faxed patient's facesheet and REA to [facility] at above listed fax number.       ANUSHKA Guido [facility]  [facility] Work -   [phone]    Electronically signed by LEE ANN Cunha on 7/4/2022 at 9:30 AM

## 2022-07-04 NOTE — PROGRESS NOTES
Report called to [address]. All questions answered and notified that patient was still having confusion. All questions answered.

## 2022-07-04 NOTE — DISCHARGE SUMMARY
Patient: Minnie Marquez     Gender: female  : 10/29/1929   Age: 80 y.o. MRN: [id number]    Admitting Physician: Kit Franco MD  Discharge Physician: Kit Franco MD     Code Status: Prior     Admit Date: 2022   Discharge Date: 2022      Disposition:  Home    Discharge Diagnoses:  Fall mechanical  Intraventricular hemorrhage stable      Active Hospital Problems    Diagnosis Date Noted    Intraventricular hemorrhage ([address] [I61.5] 2022     Priority: Medium    Headache [R51.9] 2022     Priority: Medium    Scalp laceration [S01.01XA] 2022     Priority: Medium    Fall at nursing home [A12. Sri Ma, Y92.129] 2022     Priority: Medium    Dementia ([address] [F03.90] 2022     Priority: Medium    Paroxysmal atrial fibrillation ([address] [I48.0]     Hypothyroid [E03.9] 02/10/2014    Hyperlipidemia [E78.5]        Follow-up appointments:  one week    Outpatient to do list: Keep off any antiplatelets or anticoagulants for at least 2 weeks  Remove clip in 1 week time from the back of the head    Condition at Discharge:  Stable    Hospital Course:   80year old history of paroxysmal atrial fibrillation dementia hyperlipidemia hypothyroidism admitted to the hospital after a fall     Fall probably mechanical  Laceration to the back of the head     Intraventricular hemorrhage posttraumatic  CT of the head showed intraventricular hemorrhage repeat CT head this morning stable  The patient was seen by neurosurgery  No further intervention     Son at bedside also discussed with the son and the patient they do not want any sort of aggressive intervention basically want her to go back to the hospital  Labs have all been reviewed  Clearly understands not to take any antiplatelets or anticoagulants for 2 weeks  Patient is DNR CC    Discharge Medications:   Discharge Medication List as of 2022  9:17 AM        Discharge Medication List as of 2022  9:17 AM        Discharge Medication List as of 7/4/2022  9:17 AM      CONTINUE these medications which have NOT CHANGED    Details   hydroCHLOROthiazide (MICROZIDE) 12.5 MG capsule Take 12.5 mg by mouth dailyHistorical Med      traZODone (DESYREL) 50 MG tablet Take 0.5 tablets by mouth nightly Take 1/2 tab at bedtime, Disp-15 tablet, R-0Normal      melatonin 3 MG TABS tablet Take 9 mg by mouth nightly Historical Med      omeprazole (PRILOSEC) 20 MG delayed release capsule Take 20 mg by mouth every morning (before breakfast) Historical Med      potassium chloride (MICRO-K) 10 MEQ extended release capsule Take 10 mEq by mouth dailyHistorical Med      sodium chloride (OCEAN, BABY AYR) 0.65 % nasal spray 1 spray by Nasal route as needed for CongestionHistorical Med      acetaminophen (TYLENOL) 500 MG tablet Take 1,000 mg by mouth 2 times daily Historical Med      amiodarone (CORDARONE) 200 MG tablet Take 0.5 tablets by mouth daily, Disp-30 tablet, R-5Normal      Lift Chair MISC Starting Fri 7/20/2018, Disp-1 each, R-0, Print      senna (SENOKOT) 8.6 MG tablet Take 2 tablets by mouth 2 times daily Historical Med      Cholecalciferol (VITAMIN D3) 5000 units TABS Take 1 tablet by mouth every other day Historical Med      levothyroxine (SYNTHROID) 75 MCG tablet TAKE 1 TABLET BY MOUTH EVERY DAY, Disp-30 tablet, R-2Normal           Discharge Medication List as of 7/4/2022  9:17 AM      STOP taking these medications       clopidogrel (PLAVIX) 75 MG tablet Comments:   Reason for Stopping:         diclofenac sodium (VOLTAREN) 1 % GEL Comments:   Reason for Stopping:               Discharge ROS:  A complete review of systems was asked and negative    Discharge Exam:    /72   Pulse 61   Temp 98.1 °F (36.7 °C) (Oral)   Resp 16   Ht 5' 4\" (1.626 m)   Wt 177 lb 14.4 oz (80.7 kg)   SpO2 98%   BMI 30.54 kg/m²   General appearance:  NAD  HEENT:   Normal cephalic, atraumatic, moist mucous membranes, no oropharyngeal erythema or exudate  Heart[de-identified] Normal s1/s2, RRR, no murmurs, gallops, or rubs. no leg edema  Lungs:  Normal respiratory effort. Clear to auscultation, bilaterally without Rales/Wheezes/Rhonchi. Abdomen: Soft, non-tender, non-distended, bowel sounds present, no masses  Musculoskeletal:  No clubbing, no cyanosis, *  Neurologic:  Neurovascularly intact without any focal sensory/motor deficits. Cranial nerves: II-XII intact, grossly non-focal.  Laceration on the right side of the back of the head no active bleeding clip in place which will need to be removed in 1 week time  Labs: For convenience and continuity at follow-up the following most recent labs are provided:    Lab Results   Component Value Date/Time    WBC 8.1 07/03/2022 12:10 PM    HGB 14.5 07/03/2022 12:10 PM    HCT 44.2 07/03/2022 12:10 PM    MCV 97.2 07/03/2022 12:10 PM     07/03/2022 12:10 PM     07/03/2022 12:10 PM    K 4.5 07/03/2022 12:10 PM    CL 99 07/03/2022 12:10 PM    CO2 20 07/03/2022 12:10 PM    BUN 16 07/03/2022 12:10 PM    CREATININE 1.0 07/03/2022 12:10 PM    CALCIUM 10.0 07/03/2022 12:10 PM    PHOS 2.8 07/10/2018 02:13 PM     02/10/2014 07:46 PM    ALKPHOS 92 02/04/2021 10:46 AM    ALT 8 02/04/2021 10:46 AM    AST 15 02/04/2021 10:46 AM    BILITOT 0.3 02/04/2021 10:46 AM    LABALBU 4.3 02/04/2021 10:46 AM    LDLCALC 116 02/11/2014 05:56 AM    TRIG 67 02/11/2014 05:56 AM     Lab Results   Component Value Date    INR 1.01 04/26/2020    INR 0.97 07/04/2018    INR 0.94 07/29/2013           The patient was seen and examined on day of discharge and this discharge summary is in conjunction with any daily progress note from day of discharge. Time Spent on discharge is 45 minutes  in the examination, evaluation, counseling and review of medications and discharge plan.       Note that greater  than 30 minutes was spent in preparing discharge papers, discussing discharge with patient, medication review, etc.       Signed:    Karson Mata MD   7/4/2022      Thank you Patricia Glass MD for the opportunity to be involved in this patient's care.  If you have any questions or concerns please feel free to contact me

## 2022-07-04 NOTE — CARE COORDINATION
Discharge Planning:     (CM) called and spoke with patient's Son, Darren Rubio (on emergency contact list), and verbally informed of IMM Letter and right to appeal patient's discharge as patient is a Medicare recipient. Son verbalized understanding and declined wanting an IMM Letter copy mailed to him.         ANUSHKA Edwards, [facility] -   [phone]    Electronically signed by LEE ANN Bishop on 7/4/2022 at 9:16 AM

## 2022-07-05 ENCOUNTER — TELEPHONE (OUTPATIENT)
Dept: FAMILY MEDICINE CLINIC | Age: 87
End: 2022-07-05

## 2022-07-05 NOTE — TELEPHONE ENCOUNTER
[facility] Initial Follow Up Call    Outreach made within 2 business days of discharge: Yes    Patient: Tamiko Blair Patient : 10/29/1929   MRN: [id number]  Reason for Admission: There are no discharge diagnoses documented for the most recent discharge. Discharge Date: 22       Spoke with: PATIENT IS A [facility] PT AND WILL BE FOLLOWED BY [facility]     Discharge department/facility: [facility]     [facility] Interactive Patient Contact:  Was patient able to fill all prescriptions:  [facility]  Was patient instructed to bring all medications to the follow-up visit[de-identified] [facility]   Is patient taking all medications as directed in the discharge summary? [facility]  Does patient understand their discharge instructions:[facility]   Does patient have questions or concerns that need addressed prior to 7-14 day follow up office visit: [facility]     Scheduled appointment with PCP within 7-14 days    Follow Up  No future appointments.     Adam Cage MA

## 2022-07-28 NOTE — DISCHARGE SUMMARY
[place] [address]                     [facility], [address]                               DISCHARGE SUMMARY    PATIENT NAME: William Rosario                    :        10/29/1929  MED REC NO:   [id number]                          ROOM:       3310  ACCOUNT NO:   [de-identified]                           ADMIT DATE: 2022  PROVIDER:     Byron Nicholas MD                  DISCHARGE DATE:  2022    FINAL DIAGNOSES:  1.  Status post atrial fibrillation with rapid ventricular response. 2.  Hypotension. 3.  Syncope. 4.  Paroxysmal atrial fibrillation. 5.  Hypothyroidism. 6.  Bradycardia. 7.  Hyperlipidemia. 8.  Dementia. DISCHARGE MEDICATIONS:  1.  Microzide 12.5 mg daily. 2.  Trazodone 50 mg half-tablet by mouth nightly and half-tablet at  bedtime. 3.  Melatonin 9 mg nightly. 4.  Prilosec 20 mg every morning. 5.  Micro-K 10 mEq every day. 6.  Ocean nasal saline spray one spray each nostril daily. 7.  Plavix 75 mg once a day. 8.  Tylenol 650 q.4 p.r.n.  9.  Voltaren gel 2 gm apply topically twice a day. 10.  Amiodarone 200 mg half-tablet daily. 11.  Senna 8.6 mg 2 tablets two times a day. 12.  Vitamin D3 5000 units daily. 13.  Levothyroxine 75 mcg once a day. HOSPITAL COURSE:  A 80-year-old white lady came to the emergency room  with loss of consciousness. The patient was totally disoriented. Stable vital signs. Blood pressure 93/51, respiration 18, heart rate  57. Lab evaluation shows sodium 131, potassium 3.9, chloride 96, CO2  24, BUN 17, creatinine 1.1, anion gap is 11, GFR is 46, blood glucose is  112. ProBNP level 1006. Troponin was less than 0.01. White blood cell  count was 6.6, hemoglobin and hematocrit were 14.6 and 45.2, platelet  count 105. Urinalysis negative for UTI. Chest x-ray shows no acute  cardiopulmonary disease.   EKG shows atrial fibrillation with rapid  ventricular response with occasional sinus bradycardia, treated with IV  Cardizem, DVT prophylaxis, stroke prophylaxis. Electrophysiology,  Cardiology consultation. A transthoracic echo was done back in 2018. LVEF at that time was 60% with mild tricuspid regurgitation. The  patient was evaluated by Dr. Loly Patel. Necessary antiarrhythmic  arrangements were made. Jony Burks saw the patient on behalf of Dr. Loly Patel, and Dr. Riana Burton was filling in for Dr. Loly Patel. The patient  was discharged in stable condition. Elvin Grubbs _____ made necessary  arrangement for the patient to go to [facility]. Long-term prognosis is doubtful.         Rizwan Calderon MD    D: 07/27/2022 23:38:03       T: 07/27/2022 23:40:19     [id number]  Job#: [id number]     Doc#: [id number]    CC:

## 2022-10-30 ENCOUNTER — APPOINTMENT (OUTPATIENT)
Dept: CT IMAGING | Age: 87
End: 2022-10-30
Payer: MEDICARE

## 2022-10-30 ENCOUNTER — HOSPITAL ENCOUNTER (EMERGENCY)
Age: 87
Discharge: HOME | End: 2022-10-31
Payer: MEDICARE

## 2022-10-30 DIAGNOSIS — S09.90XA INJURY OF HEAD, INITIAL ENCOUNTER: Primary | ICD-10-CM
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
## 2022-10-30 LAB
A/G RATIO: 1.7 (ref 1.1–2.2)
ALBUMIN SERPL-MCNC: 4.1 G/DL (ref 3.4–5)
ALP BLD-CCNC: 59 U/L (ref 40–129)
ALT SERPL-CCNC: 9 U/L (ref 10–40)
ANION GAP SERPL CALCULATED.3IONS-SCNC: 10 MMOL/L (ref 3–16)
AST SERPL-CCNC: 17 U/L (ref 15–37)
BASOPHILS ABSOLUTE: 0.1 K/UL (ref 0–0.2)
BASOPHILS RELATIVE PERCENT: 0.9 %
BILIRUB SERPL-MCNC: 0.3 MG/DL (ref 0–1)
BILIRUBIN URINE: NEGATIVE
BLOOD, URINE: NEGATIVE
BUN BLDV-MCNC: 17 MG/DL (ref 7–20)
CALCIUM SERPL-MCNC: 9.7 MG/DL (ref 8.3–10.6)
CHLORIDE BLD-SCNC: 103 MMOL/L (ref 99–110)
CLARITY: CLEAR
CO2: 21 MMOL/L (ref 21–32)
COLOR: YELLOW
CREAT SERPL-MCNC: 1.2 MG/DL (ref 0.6–1.2)
EOSINOPHILS ABSOLUTE: 0.3 K/UL (ref 0–0.6)
EOSINOPHILS RELATIVE PERCENT: 3.4 %
GFR SERPL CREATININE-BSD FRML MDRD: 42 ML/MIN/{1.73_M2}
GLUCOSE BLD-MCNC: 102 MG/DL (ref 70–99)
GLUCOSE URINE: NEGATIVE MG/DL
HCT VFR BLD CALC: 43.4 % (ref 36–48)
HEMOGLOBIN: 14 G/DL (ref 12–16)
KETONES, URINE: NEGATIVE MG/DL
LEUKOCYTE ESTERASE, URINE: NEGATIVE
LYMPHOCYTES ABSOLUTE: 2.2 K/UL (ref 1–5.1)
LYMPHOCYTES RELATIVE PERCENT: 28.2 %
MCH RBC QN AUTO: 30.8 PG (ref 26–34)
MCHC RBC AUTO-ENTMCNC: 32.3 G/DL (ref 31–36)
MCV RBC AUTO: 95.3 FL (ref 80–100)
MICROSCOPIC EXAMINATION: NORMAL
MONOCYTES ABSOLUTE: 0.5 K/UL (ref 0–1.3)
MONOCYTES RELATIVE PERCENT: 7 %
NEUTROPHILS ABSOLUTE: 4.7 K/UL (ref 1.7–7.7)
NEUTROPHILS RELATIVE PERCENT: 60.5 %
NITRITE, URINE: NEGATIVE
PDW BLD-RTO: 13.8 % (ref 12.4–15.4)
PH UA: 7.5 (ref 5–8)
PLATELET # BLD: 130 K/UL (ref 135–450)
PMV BLD AUTO: 9.3 FL (ref 5–10.5)
POTASSIUM REFLEX MAGNESIUM: 4.6 MMOL/L (ref 3.5–5.1)
PROTEIN UA: NEGATIVE MG/DL
RBC # BLD: 4.55 M/UL (ref 4–5.2)
SLIDE REVIEW: ABNORMAL
SODIUM BLD-SCNC: 134 MMOL/L (ref 136–145)
SPECIFIC GRAVITY UA: 1.01 (ref 1–1.03)
TOTAL PROTEIN: 6.5 G/DL (ref 6.4–8.2)
TROPONIN: <0.01 NG/ML
URINE REFLEX TO CULTURE: NORMAL
URINE TYPE: NORMAL
UROBILINOGEN, URINE: 0.2 E.U./DL
WBC # BLD: 7.7 K/UL (ref 4–11)

## 2022-10-30 PROCEDURE — 70450 CT HEAD/BRAIN W/O DYE: CPT

## 2022-10-30 PROCEDURE — 72125 CT NECK SPINE W/O DYE: CPT

## 2022-10-30 PROCEDURE — 80053 COMPREHEN METABOLIC PANEL: CPT

## 2022-10-30 PROCEDURE — 84484 ASSAY OF TROPONIN QUANT: CPT

## 2022-10-30 PROCEDURE — 51701 INSERT BLADDER CATHETER: CPT

## 2022-10-30 PROCEDURE — 85025 COMPLETE CBC W/AUTO DIFF WBC: CPT

## 2022-10-30 PROCEDURE — 93005 ELECTROCARDIOGRAM TRACING: CPT | Performed by: PHYSICIAN ASSISTANT

## 2022-10-30 PROCEDURE — 81003 URINALYSIS AUTO W/O SCOPE: CPT

## 2022-10-30 PROCEDURE — 99284 EMERGENCY DEPT VISIT MOD MDM: CPT

## 2022-10-30 RX ORDER — NYSTATIN 100000 [USP'U]/G
POWDER TOPICAL 4 TIMES DAILY
COMMUNITY

## 2022-10-30 RX ORDER — GALANTAMINE HYDROBROMIDE 8 MG/1
8 CAPSULE, EXTENDED RELEASE ORAL
COMMUNITY

## 2022-10-30 RX ORDER — MAGNESIUM HYDROXIDE/ALUMINUM HYDROXICE/SIMETHICONE 120; 1200; 1200 MG/30ML; MG/30ML; MG/30ML
5 SUSPENSION ORAL EVERY 6 HOURS PRN
COMMUNITY

## 2022-10-30 ASSESSMENT — ENCOUNTER SYMPTOMS
EYE PAIN: 0
RHINORRHEA: 0
ABDOMINAL PAIN: 0
VOMITING: 0
DIARRHEA: 0
NAUSEA: 0
SHORTNESS OF BREATH: 0
CONSTIPATION: 0
COUGH: 0
SORE THROAT: 0
BACK PAIN: 0

## 2022-10-30 ASSESSMENT — LIFESTYLE VARIABLES
HOW MANY STANDARD DRINKS CONTAINING ALCOHOL DO YOU HAVE ON A TYPICAL DAY: PATIENT DOES NOT DRINK
HOW OFTEN DO YOU HAVE A DRINK CONTAINING ALCOHOL: NEVER

## 2022-10-30 NOTE — ED PROVIDER NOTES
[facility]        Pt Name: Rula Goss  MRN: [id number]  [place] 10/29/1929  Date of evaluation: 10/30/2022  Provider: ADRIANA Aguirre  PCP: Radene Lanes, MD  Note Started: 6:54 PM EDT       BENJIE. I have evaluated this patient. My supervising physician was available for consultation. CHIEF COMPLAINT       Chief Complaint   Patient presents with    Fall     Pt in from [facility] via [address] EMS reporting a fall with head lac and denies LOC. Pt has a history of multiple falls and ambulates with a walker. HISTORY OF PRESENT ILLNESS   (Location, Timing/Onset, Context/Setting, Quality, Duration, Modifying Factors, Severity, Associated Signs and Symptoms)  Note limiting factors. Chief Complaint: Fall, head injury    Rula Goss is a 80 y.o. female who presents to the emergency department due to a fall occurring 30 minutes prior to EMS arriving and hitting her head. Per nursing staff EMS states that this was a mechanical fall as she has had multiple falls in the past that she normally uses a walker to walk around. Patient is not on any blood thinners. Patient did sustain a small abrasion to the posterior right portion of head. This was addressed by EMS prior to arrival.  There is no active hemorrhage noted. Patient was recently hospitalized in July of this year for intraventricular hemorrhage. Nursing Notes were all reviewed and agreed with or any disagreements were addressed in the HPI. REVIEW OF SYSTEMS    (2-9 systems for level 4, 10 or more for level 5)     Review of Systems   Constitutional:  Negative for chills, diaphoresis and fever. HENT:  Negative for congestion, rhinorrhea and sore throat. Eyes:  Negative for pain and visual disturbance. Respiratory:  Negative for cough and shortness of breath. Cardiovascular:  Negative for chest pain and leg swelling. CHAIR MISC    by Does not apply route    MELATONIN 3 MG TABS TABLET    Take 9 mg by mouth nightly     NYSTATIN (MYCOSTATIN) 233006 UNIT/GM POWDER    Apply topically 4 times daily Apply topically 4 times daily. OMEPRAZOLE (PRILOSEC) 20 MG DELAYED RELEASE CAPSULE    Take 20 mg by mouth every morning (before breakfast)     POTASSIUM CHLORIDE (MICRO-K) 10 MEQ EXTENDED RELEASE CAPSULE    Take 10 mEq by mouth daily    SENNA (SENOKOT) 8.6 MG TABLET    Take 2 tablets by mouth 2 times daily    SERTRALINE (ZOLOFT) 50 MG TABLET    Take 50 mg by mouth daily    SODIUM CHLORIDE (OCEAN, BABY AYR) 0.65 % NASAL SPRAY    1 spray by Nasal route as needed for Congestion    TRAZODONE (DESYREL) 50 MG TABLET    Take 0.5 tablets by mouth nightly Take 1/2 tab at bedtime         ALLERGIES     Shellfish-derived products, Shrimp flavor, Timolol, Tramadol, Sulfa antibiotics, and Sulfa antibiotics    FAMILYHISTORY       Family History   Problem Relation Age of Onset    Other Mother         dementia    Heart Disease Father           SOCIAL HISTORY       Social History     Tobacco Use    Smoking status: Never    Smokeless tobacco: Never   Vaping Use    Vaping Use: Never used   Substance Use Topics    Alcohol use: No     Comment: rare    Drug use: No       SCREENINGS             PHYSICAL EXAM    (up to 7 for level 4, 8 or more for level 5)     ED Triage Vitals   BP Temp Temp src Pulse Resp SpO2 Height Weight   -- -- -- -- -- -- -- --       Physical Exam  Vitals and nursing note reviewed. Constitutional:       General: She is not in acute distress. Appearance: She is normal weight. She is not ill-appearing. HENT:      Head:      Comments: Minor abrasion to the posterior right upper scalp area; not needing sutures or repair. Right Ear: Tympanic membrane normal.      Left Ear: Tympanic membrane normal.      Mouth/Throat:      Mouth: Mucous membranes are moist.      Pharynx: Oropharynx is clear.  No oropharyngeal exudate or posterior oropharyngeal erythema. Eyes:      Extraocular Movements: Extraocular movements intact. Pupils: Pupils are equal, round, and reactive to light. Cardiovascular:      Rate and Rhythm: Normal rate and regular rhythm. Pulses: Normal pulses. Heart sounds: Normal heart sounds. Pulmonary:      Effort: Pulmonary effort is normal.      Breath sounds: Normal breath sounds. Abdominal:      General: Bowel sounds are normal. There is no distension. Palpations: There is no mass. Tenderness: There is no abdominal tenderness. Musculoskeletal:      Cervical back: Normal range of motion and neck supple. Neurological:      Mental Status: She is alert. GCS: GCS eye subscore is 4. GCS verbal subscore is 5. GCS motor subscore is 6. Cranial Nerves: Cranial nerves 2-12 are intact. Sensory: Sensation is intact. Motor: Motor function is intact. Coordination: Coordination is intact. Gait: Gait is intact. Psychiatric:         Mood and Affect: Mood normal.         Behavior: Behavior normal.       DIAGNOSTIC RESULTS   LABS:    Labs Reviewed   COMPREHENSIVE METABOLIC PANEL W/ REFLEX TO MG FOR LOW K - Abnormal; Notable for the following components:       Result Value    Sodium 134 (*)     Glucose 102 (*)     Est, Glom Filt Rate 42 (*)     ALT 9 (*)     All other components within normal limits   CBC WITH AUTO DIFFERENTIAL - Abnormal; Notable for the following components:    Platelets 722 (*)     All other components within normal limits   URINALYSIS WITH REFLEX TO CULTURE   TROPONIN       When ordered only abnormal lab results are displayed. All other labs were within normal range or not returned as of this dictation. EKG: When ordered, EKG's are interpreted by the Emergency Department Physician in the absence of a cardiologist.  Please see their note for interpretation of EKG.     RADIOLOGY:   Non-plain film images such as CT, Ultrasound and MRI are read by the radiologist. Plain radiographic images are visualized and preliminarily interpreted by the ED Provider with the below findings:        Interpretation per the Radiologist below, if available at the time of this note:    CT CERVICAL SPINE WO CONTRAST   Final Result   Multilevel degenerative disc disease. Unchanged grade 1 anterolisthesis of   C4 on C5. No gross fracture. CT HEAD WO CONTRAST   Preliminary Result   1. No acute intracranial abnormality. 2. Right posterior parietal soft tissue swelling. No associated calvarial   abnormality. 3. Chronic periventricular white matter ischemic changes. 4. Chronic mild enlargement of the ventricles out of proportion to the sulci. Although not diagnostic, this can be associated with normal pressure   hydrocephalus in the appropriate clinical setting. No results found. PROCEDURES   Unless otherwise noted below, none     Procedures    CRITICAL CARE TIME       CONSULTS:  None      EMERGENCY DEPARTMENT COURSE and DIFFERENTIAL DIAGNOSIS/MDM:   Vitals:    Vitals:    10/30/22 2256 10/30/22 2326 10/30/22 2345 10/31/22 0000   BP: (!) 144/74 (!) 164/74 (!) 156/70 (!) 162/64   Pulse: 64 65 63 62   Resp: 15 13 16 16   SpO2:  98% 96% 97%       Patient was given the following medications:  Medications - No data to display      Is this patient to be included in the SEP-1 Core Measure due to severe sepsis or septic shock? No   Exclusion criteria - the patient is NOT to be included for SEP-1 Core Measure due to: Infection is not suspected    80year-old female who presents emerged department via EMS from [facility] presents with mechanical fall. According to EMS staff and nursing staff the patient has frequent falls like this in the past that she uses a walker to get around. Apparently the patient fell backwards hitting the back of her head causing a minor abrasion to the back of the head.   On initial presentation patient is not able to answer any my questions and is very hard of hearing. Patient is able to look at me and answer yes or no questions by shaking her head. Patient is able to follow my commands and has a reassuring neuro exam.  Given that the nature of the fall CT scan of the head and neck were ordered did not show any acute abnormalities. This was a presumed mechanical fall but further work-up was done to rule out any other conditions that may have caused her to fall. CBC CMP were ordered and did not show any acute significant abnormalities. Urinalysis was difficult to obtain here in the emergency department but eventually nursing staff was able to use a straight cath to get urine and there was no abnormality seen in the urine. At time of EKG sinus bradycardia was noted but was having normal heart rate later in the ED course. Further details from the patient's daughter were obtained after the daughter arrived in the emergency department stating that she has a history of dementia and has been having worsening ataxia and has frequent falls. Posterior scalp showed a mild abrasion that did not need any laceration repair. Patient will be discharged at this time to return to her care facility and to follow-up with her primary care doctor this week for recheck. Low suspicion for intracranial hemorrhage, arrhythmias, electrolyte abnormalities, stroke, or other acute etiologies at this time. FINAL IMPRESSION      1. Injury of head, initial encounter    2.  Fall, initial encounter          DISPOSITION/PLAN   DISPOSITION Decision To Discharge 10/30/2022 11:32:34 PM      PATIENT REFERRED TO:  Lelia Comer MD  [facility] [address]  [phone]    Schedule an appointment as soon as possible for a visit on 11/1/2022  [facility] Emergency Department  [address] [place] [address]  [phone]    As needed, If symptoms worsen    DISCHARGE MEDICATIONS:  New Prescriptions    No medications on file       DISCONTINUED MEDICATIONS:  Discontinued Medications    No medications on file              (Please note that portions of this note were completed with a voice recognition program.  Efforts were made to edit the dictations but occasionally words are mis-transcribed.)    ADRIANA Pierre (electronically signed)            ADRIANA Pierre  10/31/22 6748

## 2022-10-31 VITALS
HEART RATE: 62 BPM | DIASTOLIC BLOOD PRESSURE: 64 MMHG | RESPIRATION RATE: 16 BRPM | SYSTOLIC BLOOD PRESSURE: 162 MMHG | OXYGEN SATURATION: 97 %

## 2022-10-31 LAB
EKG ATRIAL RATE: 58 BPM
EKG DIAGNOSIS: NORMAL
EKG P-R INTERVAL: 168 MS
EKG Q-T INTERVAL: 418 MS
EKG QRS DURATION: 94 MS
EKG QTC CALCULATION (BAZETT): 410 MS
EKG R AXIS: -3 DEGREES
EKG T AXIS: 219 DEGREES
EKG VENTRICULAR RATE: 58 BPM

## 2022-10-31 PROCEDURE — 93010 ELECTROCARDIOGRAM REPORT: CPT | Performed by: INTERNAL MEDICINE

## 2022-10-31 NOTE — DISCHARGE INSTRUCTIONS
With your primary care doctor on Tuesday for recheck    Return to emergency room if he has any worsening symptoms    Tylenol and ibuprofen for pain as needed.

## 2022-10-31 NOTE — ED PROVIDER NOTES
I was available for consultation on the patient if deemed necessary by advanced practice provider. I was not involved in the care of this patient nor had any participation in the medical decision making process. I was the attending on duty when the patient came to the ER was seen independently by the BENJIE. The patient was discharged or admitted from the ER without my knowledge. I was available for consultation but was not requested to evaluate the patient, nor asked supervised the case. I did not see the patient and I am signing this chart administratively after the fact. EKG  The Ekg interpreted by me shows  normal sinus rhythm with a rate of 58  Axis is   Left axis deviation  QTc is  within an acceptable range  Intervals and Durations are unremarkable. ST Segments: no acute change and nonspecific changes  Delta waves, Brugada Syndrome, and Short TN are not present. Prior EKG to compare with was available. No significant changes compared to prior EKG from July 3, 2020.       Kiana Kasper MD  10/30/22 2025

## 2022-10-31 NOTE — ED NOTES
Attempt to call report to [facility] ([facility]) multiple times, each time unsuccessful, attempted to call [facility] to confirm phone number, and hours once again unsuccessful. Report given to CMT transport, VSS at time of discharge, and Pt left facility without incident.      Raúl Patel RN  10/31/22 3385

## 2022-10-31 NOTE — ED NOTES
Urine collection via Straight Catheterization using sterile technique per MD order. Patient tolerated well.          Rylan Mejia RN  10/30/22 4850

## 2023-07-03 ENCOUNTER — APPOINTMENT (OUTPATIENT)
Dept: CT IMAGING | Age: 88
End: 2023-07-03
Payer: MEDICARE

## 2023-07-03 ENCOUNTER — HOSPITAL ENCOUNTER (EMERGENCY)
Age: 88
Discharge: NURSING, REHAB OR LONG-TERM CARE | End: 2023-07-04
Payer: MEDICARE

## 2023-07-03 DIAGNOSIS — S01.01XA LACERATION OF SCALP, INITIAL ENCOUNTER: ICD-10-CM

## 2023-07-03 DIAGNOSIS — S09.90XA CLOSED HEAD INJURY, INITIAL ENCOUNTER: Primary | ICD-10-CM

## 2023-07-03 PROCEDURE — 70450 CT HEAD/BRAIN W/O DYE: CPT

## 2023-07-03 PROCEDURE — 72125 CT NECK SPINE W/O DYE: CPT

## 2023-07-03 PROCEDURE — 90471 IMMUNIZATION ADMIN: CPT | Performed by: PHYSICIAN ASSISTANT

## 2023-07-03 PROCEDURE — 12002 RPR S/N/AX/GEN/TRNK2.6-7.5CM: CPT

## 2023-07-03 PROCEDURE — 90714 TD VACC NO PRESV 7 YRS+ IM: CPT | Performed by: PHYSICIAN ASSISTANT

## 2023-07-03 PROCEDURE — 6360000002 HC RX W HCPCS: Performed by: PHYSICIAN ASSISTANT

## 2023-07-03 PROCEDURE — 99284 EMERGENCY DEPT VISIT MOD MDM: CPT

## 2023-07-03 RX ADMIN — CLOSTRIDIUM TETANI TOXOID ANTIGEN (FORMALDEHYDE INACTIVATED) AND CORYNEBACTERIUM DIPHTHERIAE TOXOID ANTIGEN (FORMALDEHYDE INACTIVATED) 0.5 ML: 5; 2 INJECTION, SUSPENSION INTRAMUSCULAR at 21:00

## 2023-07-03 NOTE — ED NOTES
Patient arrives with no shirt. Has lac noted to back of head. Hair looks pink due to amount of blood in hair.       Flora Feng RN  07/03/23 1954

## 2023-07-04 VITALS
DIASTOLIC BLOOD PRESSURE: 57 MMHG | TEMPERATURE: 98 F | RESPIRATION RATE: 18 BRPM | SYSTOLIC BLOOD PRESSURE: 141 MMHG | HEART RATE: 57 BPM | OXYGEN SATURATION: 97 %

## 2023-07-04 NOTE — ED PROVIDER NOTES
[facility]        Pt Name: Jeannine Ross  MRN: [id number]  [address] [facility] 10/29/1929  Date of evaluation: 7/3/2023  Provider: Kelley Yao PA-C  PCP: Steve Moore MD  Note Started: 8:17 PM EDT 7/3/23      BENJIE. I have evaluated this patient. CHIEF COMPLAINT       Chief Complaint   Patient presents with    Head Injury     Patient in by Nicci Lozoya from [place]. [address] [facility] EMS states patient attempted to walk without walker and fell, patient has lac noted to back of head. HISTORY OF PRESENT ILLNESS: 1 or more Elements     History From: Patient  Limitations to history : None    Jeannine Ross is a 80 y.o. female who presents to the emergency department today for evaluation for a fall as well as a closed head injury. The patient is from [facility], she does have a history of dementia and is otherwise acting at her baseline. Patient was walking without a walker, and normally has to walk with a walker. This caused the patient to fall and hit the back of her head. There is no loss of consciousness or vomiting. Patient does have a laceration noted to the scalp, she is unsure of her last tetanus. Patient has no complaints of any headaches. There has not been any vomiting. She is not on any blood thinners, no other history is able to be obtained at this time    Nursing Notes were all reviewed and agreed with or any disagreements were addressed in the HPI. REVIEW OF SYSTEMS :      Review of Systems   Unable to perform ROS: Dementia     Positives and Pertinent negatives as per HPI.      SURGICAL HISTORY     Past Surgical History:   Procedure Laterality Date    APPENDECTOMY      OTHER SURGICAL HISTORY  07/05/2018    RIGHT hip gamma nail     OVARY SURGERY      TONSILLECTOMY         CURRENTMEDICATIONS       Previous Medications    ACETAMINOPHEN (TYLENOL) 500 MG TABLET    Take 1,000 mg by mouth 2 times

## 2023-07-27 ENCOUNTER — TELEPHONE (OUTPATIENT)
Dept: CASE MANAGEMENT | Age: 88
End: 2023-07-27

## 2023-07-27 NOTE — TELEPHONE ENCOUNTER
Call to [address] with [facility].   Will fax CT cervical to her at [phone] for MD review of incidental pulmonary nodule